# Patient Record
Sex: FEMALE | Race: WHITE | NOT HISPANIC OR LATINO | Employment: OTHER | URBAN - METROPOLITAN AREA
[De-identification: names, ages, dates, MRNs, and addresses within clinical notes are randomized per-mention and may not be internally consistent; named-entity substitution may affect disease eponyms.]

---

## 2017-02-17 ENCOUNTER — TRANSCRIBE ORDERS (OUTPATIENT)
Dept: ADMINISTRATIVE | Facility: HOSPITAL | Age: 82
End: 2017-02-17

## 2017-02-17 DIAGNOSIS — Z12.31 VISIT FOR SCREENING MAMMOGRAM: Primary | ICD-10-CM

## 2017-03-02 ENCOUNTER — APPOINTMENT (OUTPATIENT)
Dept: PREADMISSION TESTING | Facility: HOSPITAL | Age: 82
End: 2017-03-02

## 2017-03-02 ENCOUNTER — HOSPITAL ENCOUNTER (OUTPATIENT)
Dept: GENERAL RADIOLOGY | Facility: HOSPITAL | Age: 82
Discharge: HOME | End: 2017-03-02
Admitting: ORTHOPAEDIC SURGERY

## 2017-03-02 ENCOUNTER — HOSPITAL ENCOUNTER (OUTPATIENT)
Dept: GENERAL RADIOLOGY | Facility: HOSPITAL | Age: 82
Discharge: HOME | End: 2017-03-02

## 2017-03-02 VITALS
RESPIRATION RATE: 16 BRPM | HEIGHT: 60 IN | OXYGEN SATURATION: 99 % | DIASTOLIC BLOOD PRESSURE: 83 MMHG | WEIGHT: 133.4 LBS | TEMPERATURE: 97.3 F | BODY MASS INDEX: 26.19 KG/M2 | SYSTOLIC BLOOD PRESSURE: 135 MMHG

## 2017-03-02 DIAGNOSIS — M25.561 CHRONIC PAIN OF RIGHT KNEE: Primary | ICD-10-CM

## 2017-03-02 DIAGNOSIS — G89.29 CHRONIC PAIN OF RIGHT KNEE: Primary | ICD-10-CM

## 2017-03-02 LAB
ABO GROUP BLD: NORMAL
ALBUMIN SERPL-MCNC: 4.1 G/DL (ref 3.5–5.2)
ALBUMIN/GLOB SERPL: 1.5 G/DL
ALP SERPL-CCNC: 66 U/L (ref 39–117)
ALT SERPL W P-5'-P-CCNC: 17 U/L (ref 1–33)
ANION GAP SERPL CALCULATED.3IONS-SCNC: 12.6 MMOL/L
AST SERPL-CCNC: 22 U/L (ref 1–32)
BACTERIA UR QL AUTO: ABNORMAL /HPF
BILIRUB SERPL-MCNC: 0.4 MG/DL (ref 0.1–1.2)
BILIRUB UR QL STRIP: NEGATIVE
BLD GP AB SCN SERPL QL: NEGATIVE
BUN BLD-MCNC: 17 MG/DL (ref 8–23)
BUN/CREAT SERPL: 15.5 (ref 7–25)
CALCIUM SPEC-SCNC: 10.8 MG/DL (ref 8.6–10.5)
CHLORIDE SERPL-SCNC: 99 MMOL/L (ref 98–107)
CLARITY UR: CLEAR
CO2 SERPL-SCNC: 28.4 MMOL/L (ref 22–29)
COLOR UR: YELLOW
CREAT BLD-MCNC: 1.1 MG/DL (ref 0.57–1)
DEPRECATED RDW RBC AUTO: 45 FL (ref 37–54)
ERYTHROCYTE [DISTWIDTH] IN BLOOD BY AUTOMATED COUNT: 13.2 % (ref 11.7–13)
GFR SERPL CREATININE-BSD FRML MDRD: 48 ML/MIN/1.73
GLOBULIN UR ELPH-MCNC: 2.8 GM/DL
GLUCOSE BLD-MCNC: 88 MG/DL (ref 65–99)
GLUCOSE UR STRIP-MCNC: NEGATIVE MG/DL
HCT VFR BLD AUTO: 40.6 % (ref 35.6–45.5)
HGB BLD-MCNC: 13.4 G/DL (ref 11.9–15.5)
HGB UR QL STRIP.AUTO: NEGATIVE
HYALINE CASTS UR QL AUTO: ABNORMAL /LPF
INR PPP: 1.08 (ref 0.9–1.1)
KETONES UR QL STRIP: NEGATIVE
LEUKOCYTE ESTERASE UR QL STRIP.AUTO: ABNORMAL
MCH RBC QN AUTO: 30.7 PG (ref 26.9–32)
MCHC RBC AUTO-ENTMCNC: 33 G/DL (ref 32.4–36.3)
MCV RBC AUTO: 93.1 FL (ref 80.5–98.2)
NITRITE UR QL STRIP: NEGATIVE
PH UR STRIP.AUTO: <=5 [PH] (ref 5–8)
PLATELET # BLD AUTO: 346 10*3/MM3 (ref 140–500)
PMV BLD AUTO: 10.5 FL (ref 6–12)
POTASSIUM BLD-SCNC: 4.2 MMOL/L (ref 3.5–5.2)
PROT SERPL-MCNC: 6.9 G/DL (ref 6–8.5)
PROT UR QL STRIP: NEGATIVE
PROTHROMBIN TIME: 13.6 SECONDS (ref 11.7–14.2)
RBC # BLD AUTO: 4.36 10*6/MM3 (ref 3.9–5.2)
RBC # UR: ABNORMAL /HPF
REF LAB TEST METHOD: ABNORMAL
RH BLD: POSITIVE
SODIUM BLD-SCNC: 140 MMOL/L (ref 136–145)
SP GR UR STRIP: 1.01 (ref 1–1.03)
SQUAMOUS #/AREA URNS HPF: ABNORMAL /HPF
UROBILINOGEN UR QL STRIP: ABNORMAL
WBC NRBC COR # BLD: 5.65 10*3/MM3 (ref 4.5–10.7)
WBC UR QL AUTO: ABNORMAL /HPF

## 2017-03-02 PROCEDURE — 97161 PT EVAL LOW COMPLEX 20 MIN: CPT

## 2017-03-02 PROCEDURE — 71020 HC CHEST PA AND LATERAL: CPT

## 2017-03-02 PROCEDURE — G8980 MOBILITY D/C STATUS: HCPCS

## 2017-03-02 PROCEDURE — 73560 X-RAY EXAM OF KNEE 1 OR 2: CPT

## 2017-03-02 PROCEDURE — G8979 MOBILITY GOAL STATUS: HCPCS

## 2017-03-02 PROCEDURE — 85027 COMPLETE CBC AUTOMATED: CPT | Performed by: ORTHOPAEDIC SURGERY

## 2017-03-02 PROCEDURE — 36415 COLL VENOUS BLD VENIPUNCTURE: CPT

## 2017-03-02 PROCEDURE — 86850 RBC ANTIBODY SCREEN: CPT

## 2017-03-02 PROCEDURE — 93005 ELECTROCARDIOGRAM TRACING: CPT

## 2017-03-02 PROCEDURE — 85610 PROTHROMBIN TIME: CPT | Performed by: ORTHOPAEDIC SURGERY

## 2017-03-02 PROCEDURE — G8978 MOBILITY CURRENT STATUS: HCPCS

## 2017-03-02 PROCEDURE — 80053 COMPREHEN METABOLIC PANEL: CPT | Performed by: ORTHOPAEDIC SURGERY

## 2017-03-02 PROCEDURE — 86901 BLOOD TYPING SEROLOGIC RH(D): CPT

## 2017-03-02 PROCEDURE — 81001 URINALYSIS AUTO W/SCOPE: CPT | Performed by: ORTHOPAEDIC SURGERY

## 2017-03-02 PROCEDURE — 87086 URINE CULTURE/COLONY COUNT: CPT | Performed by: ORTHOPAEDIC SURGERY

## 2017-03-02 PROCEDURE — 86900 BLOOD TYPING SEROLOGIC ABO: CPT

## 2017-03-02 RX ORDER — CHLORHEXIDINE GLUCONATE 500 MG/1
1 CLOTH TOPICAL TAKE AS DIRECTED
COMMUNITY
End: 2017-03-19 | Stop reason: HOSPADM

## 2017-03-02 NOTE — DISCHARGE INSTRUCTIONS
Take the following medications the morning of surgery with a small sip of water.  OMEPRAZOLE, SYMBICORT    Arrive to hospital on your day of surgery at 5:30 am    General Instructions:  • Do not eat or drink after midnight: includes water, mints, or gum. You may brush your teeth.  • Do not smoke, chew tobacco, or drink alcohol.  • The Pre-Admission Testing nurse will instruct you to bring medications if unable to obtain an accurate list in Pre-Admission Testing.    • If applicable bring your C-PAP/ BI-PAP machine.  • Bring any papers given to you in the doctor’s office.  • Wear clean comfortable clothes and socks.  • Do not wear contact lenses or make-up.  Bring a case for your glasses if applicable.   • Bring crutches or walker if applicable.  • Leave all other valuables and jewelry at home.    If you were given a blood bank ID arm band remember to bring it with you the day of surgery.    Preventing a Surgical Site Infection:  Shower on the morning of surgery using a fresh bar of anti-bacterial soap (such as Dial) and clean washcloth.  Dry with a clean towel and dress in clean clothing.  For 2 to 3 days before surgery, avoid shaving with a razor near where you will have surgery because the razor can irritate skin and make it easier to develop an infection  Ask your surgeon if you will be receiving antibiotics prior to surgery  Make sure you, your family, and all healthcare providers clean their hands with soap and water or an alcohol based hand  before caring for you or your wound  If at all possible, quit smoking as many days before surgery as you can.    Day of surgery:  Upon arrival, a Pre-op nurse and Anesthesiologist will review your health history, obtain vital signs, and answer questions you may have.  The only belongings needed at this time will be your home medications and if applicable your C-PAP/BI-PAP machine.  If you are staying overnight your family can leave the rest of your belongings in  the car and bring them to your room later.  A Pre-op nurse will start an IV and you may receive medication in preparation for surgery, including something to help you relax.  Your family will be able to see you in the Pre-op area.  While you are in surgery your family should notify the waiting room  if they leave the waiting room area and provide a contact phone number.    Please be aware that surgery does come with discomfort.  We want to make every effort to control your discomfort so please discuss any uncontrolled symptoms with your nurse.   Your doctor will most likely have prescribed pain medications.      If you are going home after surgery you will receive individualized written care instructions before being discharged.  A responsible adult must drive you to and from the hospital on the day of your surgery and stay with you for 24 hours.    If you are staying overnight following surgery, you will be transported to your hospital room following the recovery period.  [facility] has all private rooms.    If you have any questions please call Pre-Admission Testing at [phone].  Deductibles and co-payments are collected on the day of service. Please be prepared to pay the required co-pay, deductible or deposit on the day of service as defined by your plan.    2% CHLORAHEXIDINE GLUCONATE* CLOTH  Preparing or “prepping” skin before surgery can reduce the risk of infection at the surgical site. To make the process easier, [facility] has chosen disposable cloths moistened with a rinse-free, 2% Chlorhexidine Gluconate (CHG) antiseptic solution. The steps below outline the prepping process and should be carefully followed.        Use the prep cloth on the area that is circled in the diagram             Directions Night before Surgery  1) Shower using a fresh bar of anti-bacterial soap (such as Dial) and clean washcloth.  Use a clean towel to completely dry your skin.  2) Do not  use any lotions, oils or creams on your skin.  3) Open the package and remove 1 cloth, wipe your skin for 30 seconds in a circular motion.  Allow to dry for 3 minutes.  4) Repeat #3 with second cloth.  5) Do not touch your eyes, ears, or mouth with the prep cloth.  6) Allow the wet prep solution to air dry.  7) Discard the prep cloth and wash your hands with soap and water.   8) Dress in clean bed clothes and sleep on fresh clean bed sheets.   9) You may experience some temporary itching after the prep.    Directions Day of Surgery  1) Repeat steps 1,2,3,4,5,6,7, and 9.   2) Dress in clean clothes before coming to the hospital.    BACTROBAN NASAL OINTMENT  There are many germs normally in your nose. Bactroban is an ointment that will help reduce these germs. Please follow these instructions for Bactroban use:    ____The day before surgery in the morning  Date________    ____The day before surgery in the evening              Date________    ____The day of surgery in the morning    Date________    **Squirt ½ package of Bactroban Ointment onto a cotton applicator and apply to inside of 1st nostril.  Squirt the remaining Bactroban and apply to the inside of the other nostril.

## 2017-03-02 NOTE — PROGRESS NOTES
Physical Therapy Outpatient Preoperative Total Joint Evaluation     Patient Name: Elisa Kunz  : 1935  MRN: [id number]  Today's Date: 3/2/2017         Surgery Date: 17    There is no problem list on file for this patient.       Past Medical History   Diagnosis Date   • Arthritis    • COPD (chronic obstructive pulmonary disease)    • Edema, lower extremity      BILATERAL    • GERD (gastroesophageal reflux disease)    • GERD (gastroesophageal reflux disease)    • High cholesterol    • History of diverticulitis    • Hyperlipidemia    • Knee pain, bilateral    • Low back pain    • Osteoporosis    • Peripheral venous insufficiency      BLE, HAS WRAPS FROM KNEES TO FEET, SEES [employer] IN [employer]   • Wears hearing aid         Past Surgical History   Procedure Laterality Date   • Hysterectomy     • Cholecystectomy     •  section       X 3   • Foot surgery       ? SIDE, HAS SCREW   • Lumbar spine surgery     • Cataract extraction Bilateral               TOTAL JOINT PREOP EVAL (last 72 hours)      Total Joint Preop Evaluation       17 1157                Preoperative Evaluation    Surgery TKR: Right  -TV        Surgery Date 17  -TV        Previous Total Joint No  -TV        Attended Class yes  -TV        Medical History Comment lymphedema both lower extremities  -TV        Prior Activity Status    All Household independent  -TV        All Community independent  -TV        Gait independent  -TV        Transfers independent  -TV         Spouse  -TV        DC Plans Home  -TV        Assist at home Spouse  -TV        Home Environment 2 story  -TV        Exterior steps 1 rail   3  -TV        Interior steps 1 rail   14  -TV        Pain 0-10    Pain Level 5  -TV        Location r knee  -TV        LE Measurements - ROM    Hip Flexion - Right AROM is WNL;Strength is grossly > or equal to 3/5  -TV        Hip Abduction - Right AROM is WNL;Strength is grossly >  or equal to 3/5  -TV        Knee Flexion - Right Strength is grossly > or equal to 3/5   115  -TV        Knee Extension - Right Strength is grossly > or equal to 3/5   -10  -TV        Hip Flexion - Left AROM is WNL;Strength is grossly > or equal to 3/5  -TV        Hip Abduction - Left AROM is WNL;Strength is grossly > or equal to 3/5  -TV        Knee Flexion - Left Strength is grossly > or equal to 3/5   115  -TV        Knee Extension - Left Strength is grossly > or equal to 3/5   -10  -TV        UE ROM/Strength    UE ROM/Strength adequate for walker use  -TV        Other Measurements    Sensation/Circulation intact  -TV        Gait Observation no device  -TV        Equipment    Equipment Patient Has Walker;Cane  -TV        ASSESSMENT    Goal Patient demonstrates good understanding of post-op P.T.;Exercises;Surgical Procedure  -TV        Goal Met? Yes  -TV        Anticipated Progress fair  -TV        Anticipated Progress Comment lymphedema might be an issue with recovery.    -TV        Patient agrees with Plan of Treatment? Yes  -TV        Plan Will see for post op TJR protocol  -TV          User Key  (r) = Recorded By, (t) = Taken By, (c) = Cosigned By    Initials Name Effective Dates    TV Teresa Ballesteros, PT 01/07/16 -              Time Calculation:          Therapy Charges for Today     Code Description Service Date Service Provider Modifiers Qty    12882532927 HC PT MOBILITY CURRENT 3/2/2017 Teresa Ballesteros, PT GP, CI 1    46695127889 HC PT MOBILITY PROJECTED 3/2/2017 Teresa Ballesteros, PT GP, CI 1    46698001032 HC PT MOBILITY DISCHARGE 3/2/2017 Teresa Ballesteros, PT GP, CI 1    27347172197 HC PAT-TOTAL JOINT CLASS 3/2/2017 Teresa Ballesteros, PT  1    75177003366 HC PT EVAL LOW COMPLEXITY 1 3/2/2017 Teresa Ballesteros, PT GP 1            PT G-Codes  PT Professional Judgement Used?: Yes  Functional Limitation: Mobility: Walking and moving around  Mobility: Walking and Moving Around Current Status (): At least 1 percent but less than 20  percent impaired, limited or restricted  Mobility: Walking and Moving Around Goal Status (): At least 1 percent but less than 20 percent impaired, limited or restricted  Mobility: Walking and Moving Around Discharge Status (): At least 1 percent but less than 20 percent impaired, limited or restricted      Teresa Ballesteros, PT  3/2/2017

## 2017-03-04 LAB — BACTERIA SPEC AEROBE CULT: NORMAL

## 2017-03-10 ENCOUNTER — APPOINTMENT (OUTPATIENT)
Dept: MAMMOGRAPHY | Facility: HOSPITAL | Age: 82
End: 2017-03-10

## 2017-03-15 NOTE — PROGRESS NOTES
Pre-Operative Orthopedic Assessment      Patient: Elisa Kunz    YOB: 1935      Age/Gender: 81 y.o. female  Medical Record Number: [id number]  Surgical Procedure Planned: OH TOTAL KNEE ARTHROPLASTY [09054] (TOTAL KNEE ARTHROPLASTY)     Surgeon: Boris Espinoza MD    Date of Surgery Planned: 03/16/2017    Question Value Score    Patient Score   1. What is your age group? 50-65 years  66-75 years  >75 years =2  =1  =0 0   2. Gender? Male  Female =2  =1 1   3. How far on average can you walk? (a block is 200 meters) Two blocks or more (+\- rest)  1-2 blocks (+\- rest)  Housebound (most of time) =2  =1  =0 0   4. Which gait aid to you use? (more often than not) None  Single-Point Stick  Crutches/Frame =2  =1  =0 2   5. Do you use community  supports? (home help, meals on wheels, district nursing) None or one per week  Two or more per week =1  =0 1   6. Will you live with someone who can care for you after your operation? Yes  No =3  =0 3      Your Score (out of 12)  7     Key Destination at Discharge from acute care predicted by score   Scores < 6  -- extended inpatient rehabilitation   Scores 6-9 -- additional intervention to discharge directly home (Rehabilitation in home)   Scores > 9 -- directly home         Discharge Disposition/Planning:     Patient Response   Discussed the Predicted discharge disposition needed based on RAPT Assessment with the patient.    yes   Patient selected discharge disposition:   home   Out Patient Rehabilitation Facility of Choice:    None selected/probably wants OP    Home Health Services Preferred:   None selected   Has the patient completed or been scheduled to complete pre-operative testing? yes   Post-Operative Care Giver Name and Phone Number:    Juanito Santizo  [phone]     Subacute Inpatient Rehabilitation:  Complete this section only if planning inpatient services at a Subacute Facility     Patient Response   Subacute Facility  Preferred (Please list 2 facilities:      Requires pre-certification for inpatient rehabilitation services?         Planned source of transportation to inpatient rehabilitation facility?       If choosing inpatient services at an Acute or Subacute Facility please list a subsequent back-up plan (in case patient fails to qualify for inpatient rehabilitation). Back-up plans should include caregiver (family member or friend) for first 24-48 post- -operatively.       Home Equipment Patient Response   Does patient have a walker for home use?    yes   Does patient have a 3 in 1 commode for home use?    no   Does patient have a shower chair for home use?    no   Does patient have an elevated commode seat for home use? yes   Does patient have a cane for home use?    yes   Is there any other medical equipment in the home? If so,  List in comment section below no   Pre-Operative Class Attendance Patient Response   Attended or scheduled to attend the pre-operative class within 1 year of total joint replacement? yes   Not planning to attend the pre-operative class    n/a   Has attended the pre-operative class > 1 year ago    n/a   Does not want to attend the class    n/a   Was misinformed that did not need to attend the class    n/a   Class time is not convenient    n/a   Other reasons for refusing to attend the pre-operative class (if yes, see comments below)   n/a   Patient Education  Completed   Expected time of discharge discussed yes   Encouraged to attend Pre-Operative Class    attended   Education re: Back-up plan for patients planning discharge to subacute facilities n/a   Education re: Predicted Discharge Disposition based on RAPT score    yes   Patient receptive and voiced understanding of information given    yes                                                                                                            Comments:    Discussed discharge plan with both patient and spouse on phone ad patient is leaning  toward OP vs. HH however has not had exposure to either and will need provider choice list.                                       Signature: Rosio Philip RN    Date:  3/15/2017

## 2017-03-16 ENCOUNTER — HOSPITAL ENCOUNTER (INPATIENT)
Facility: HOSPITAL | Age: 82
LOS: 3 days | Discharge: NURSING, REHAB OR LONG-TERM CARE | End: 2017-03-19
Attending: ORTHOPAEDIC SURGERY | Admitting: ORTHOPAEDIC SURGERY

## 2017-03-16 ENCOUNTER — ANESTHESIA EVENT (OUTPATIENT)
Dept: PERIOP | Facility: HOSPITAL | Age: 82
End: 2017-03-16

## 2017-03-16 ENCOUNTER — APPOINTMENT (OUTPATIENT)
Dept: GENERAL RADIOLOGY | Facility: HOSPITAL | Age: 82
End: 2017-03-16

## 2017-03-16 ENCOUNTER — ANESTHESIA (OUTPATIENT)
Dept: PERIOP | Facility: HOSPITAL | Age: 82
End: 2017-03-16

## 2017-03-16 DIAGNOSIS — R26.2 DIFFICULTY WALKING: Primary | ICD-10-CM

## 2017-03-16 PROBLEM — M17.9 OA (OSTEOARTHRITIS) OF KNEE: Status: ACTIVE | Noted: 2017-03-16

## 2017-03-16 PROCEDURE — 25010000002 FENTANYL CITRATE (PF) 100 MCG/2ML SOLUTION: Performed by: NURSE ANESTHETIST, CERTIFIED REGISTERED

## 2017-03-16 PROCEDURE — 25010000003 CEFAZOLIN IN DEXTROSE 2-4 GM/100ML-% SOLUTION: Performed by: ORTHOPAEDIC SURGERY

## 2017-03-16 PROCEDURE — 25010000002 PROMETHAZINE PER 50 MG: Performed by: ORTHOPAEDIC SURGERY

## 2017-03-16 PROCEDURE — 25010000002 CEFAZOLIN PER 500 MG: Performed by: NURSE ANESTHETIST, CERTIFIED REGISTERED

## 2017-03-16 PROCEDURE — C1713 ANCHOR/SCREW BN/BN,TIS/BN: HCPCS | Performed by: ORTHOPAEDIC SURGERY

## 2017-03-16 PROCEDURE — 25010000002 ONDANSETRON PER 1 MG: Performed by: NURSE ANESTHETIST, CERTIFIED REGISTERED

## 2017-03-16 PROCEDURE — C1776 JOINT DEVICE (IMPLANTABLE): HCPCS | Performed by: ORTHOPAEDIC SURGERY

## 2017-03-16 PROCEDURE — 25010000002 KETOROLAC TROMETHAMINE PER 15 MG

## 2017-03-16 PROCEDURE — 73560 X-RAY EXAM OF KNEE 1 OR 2: CPT

## 2017-03-16 PROCEDURE — 0SRC0J9 REPLACEMENT OF RIGHT KNEE JOINT WITH SYNTHETIC SUBSTITUTE, CEMENTED, OPEN APPROACH: ICD-10-PCS | Performed by: ORTHOPAEDIC SURGERY

## 2017-03-16 PROCEDURE — 25010000002 KETOROLAC TROMETHAMINE PER 15 MG: Performed by: ORTHOPAEDIC SURGERY

## 2017-03-16 PROCEDURE — 97110 THERAPEUTIC EXERCISES: CPT

## 2017-03-16 PROCEDURE — 25010000002 NEOSTIGMINE 10 MG/10ML SOLUTION: Performed by: NURSE ANESTHETIST, CERTIFIED REGISTERED

## 2017-03-16 PROCEDURE — 25010000002 MIDAZOLAM PER 1 MG: Performed by: ANESTHESIOLOGY

## 2017-03-16 PROCEDURE — 25010000002 PROPOFOL 10 MG/ML EMULSION: Performed by: NURSE ANESTHETIST, CERTIFIED REGISTERED

## 2017-03-16 PROCEDURE — 94799 UNLISTED PULMONARY SVC/PX: CPT

## 2017-03-16 PROCEDURE — 25010000002 EPINEPHRINE PER 0.1 MG: Performed by: ORTHOPAEDIC SURGERY

## 2017-03-16 PROCEDURE — 25010000002 MORPHINE (PF) 10 MG/ML SOLUTION 1 ML VIAL: Performed by: ORTHOPAEDIC SURGERY

## 2017-03-16 PROCEDURE — 25010000002 DEXAMETHASONE PER 1 MG: Performed by: NURSE ANESTHETIST, CERTIFIED REGISTERED

## 2017-03-16 PROCEDURE — 25010000002 ROPIVACAINE PER 1 MG: Performed by: ORTHOPAEDIC SURGERY

## 2017-03-16 PROCEDURE — 97162 PT EVAL MOD COMPLEX 30 MIN: CPT

## 2017-03-16 DEVICE — CAP TOTL KN CMT PREMIUM: Type: IMPLANTABLE DEVICE | Site: KNEE | Status: FUNCTIONAL

## 2017-03-16 DEVICE — IMPLANTABLE DEVICE
Type: IMPLANTABLE DEVICE | Site: KNEE | Status: FUNCTIONAL
Brand: BIOMET KNEE SYSTEM

## 2017-03-16 DEVICE — IMPLANTABLE DEVICE
Type: IMPLANTABLE DEVICE | Site: KNEE | Status: FUNCTIONAL
Brand: VANGUARD® KNEE SYSTEM

## 2017-03-16 DEVICE — IMPLANTABLE DEVICE
Type: IMPLANTABLE DEVICE | Site: KNEE | Status: FUNCTIONAL
Brand: VANGUARD KNEE SYSTEM

## 2017-03-16 DEVICE — BEAR TIB/KN VANGUARD CR DCM 14X71/75MM NS: Type: IMPLANTABLE DEVICE | Site: KNEE | Status: FUNCTIONAL

## 2017-03-16 DEVICE — CMT BONE PALACOS 120001: Type: IMPLANTABLE DEVICE | Site: KNEE | Status: FUNCTIONAL

## 2017-03-16 RX ORDER — DIPHENHYDRAMINE HYDROCHLORIDE 50 MG/ML
12.5 INJECTION INTRAMUSCULAR; INTRAVENOUS
Status: DISCONTINUED | OUTPATIENT
Start: 2017-03-16 | End: 2017-03-16 | Stop reason: HOSPADM

## 2017-03-16 RX ORDER — HYDROMORPHONE HYDROCHLORIDE 1 MG/ML
0.5 INJECTION, SOLUTION INTRAMUSCULAR; INTRAVENOUS; SUBCUTANEOUS
Status: DISCONTINUED | OUTPATIENT
Start: 2017-03-16 | End: 2017-03-16 | Stop reason: HOSPADM

## 2017-03-16 RX ORDER — SODIUM CHLORIDE 0.9 % (FLUSH) 0.9 %
1-10 SYRINGE (ML) INJECTION AS NEEDED
Status: DISCONTINUED | OUTPATIENT
Start: 2017-03-16 | End: 2017-03-19 | Stop reason: HOSPADM

## 2017-03-16 RX ORDER — DEXAMETHASONE SODIUM PHOSPHATE 10 MG/ML
INJECTION INTRAMUSCULAR; INTRAVENOUS AS NEEDED
Status: DISCONTINUED | OUTPATIENT
Start: 2017-03-16 | End: 2017-03-16 | Stop reason: SURG

## 2017-03-16 RX ORDER — FERROUS SULFATE 325(65) MG
325 TABLET ORAL
Status: DISCONTINUED | OUTPATIENT
Start: 2017-03-16 | End: 2017-03-19 | Stop reason: HOSPADM

## 2017-03-16 RX ORDER — BUDESONIDE AND FORMOTEROL FUMARATE DIHYDRATE 80; 4.5 UG/1; UG/1
2 AEROSOL RESPIRATORY (INHALATION) 2 TIMES DAILY
Status: DISCONTINUED | OUTPATIENT
Start: 2017-03-16 | End: 2017-03-19 | Stop reason: HOSPADM

## 2017-03-16 RX ORDER — ROCURONIUM BROMIDE 10 MG/ML
INJECTION, SOLUTION INTRAVENOUS AS NEEDED
Status: DISCONTINUED | OUTPATIENT
Start: 2017-03-16 | End: 2017-03-16 | Stop reason: SURG

## 2017-03-16 RX ORDER — ASPIRIN 325 MG
325 TABLET, DELAYED RELEASE (ENTERIC COATED) ORAL DAILY
Status: DISCONTINUED | OUTPATIENT
Start: 2017-03-17 | End: 2017-03-19 | Stop reason: HOSPADM

## 2017-03-16 RX ORDER — LABETALOL HYDROCHLORIDE 5 MG/ML
5 INJECTION, SOLUTION INTRAVENOUS
Status: DISCONTINUED | OUTPATIENT
Start: 2017-03-16 | End: 2017-03-16 | Stop reason: HOSPADM

## 2017-03-16 RX ORDER — CLINDAMYCIN PHOSPHATE 900 MG/50ML
900 INJECTION INTRAVENOUS EVERY 8 HOURS
Status: COMPLETED | OUTPATIENT
Start: 2017-03-16 | End: 2017-03-16

## 2017-03-16 RX ORDER — CEFAZOLIN SODIUM 500 MG/2.2ML
INJECTION, POWDER, FOR SOLUTION INTRAMUSCULAR; INTRAVENOUS AS NEEDED
Status: DISCONTINUED | OUTPATIENT
Start: 2017-03-16 | End: 2017-03-16 | Stop reason: SURG

## 2017-03-16 RX ORDER — DIAZEPAM 5 MG/1
5 TABLET ORAL EVERY 6 HOURS PRN
Status: DISCONTINUED | OUTPATIENT
Start: 2017-03-16 | End: 2017-03-19 | Stop reason: HOSPADM

## 2017-03-16 RX ORDER — SODIUM CHLORIDE 0.9 % (FLUSH) 0.9 %
1-10 SYRINGE (ML) INJECTION AS NEEDED
Status: DISCONTINUED | OUTPATIENT
Start: 2017-03-16 | End: 2017-03-16 | Stop reason: HOSPADM

## 2017-03-16 RX ORDER — SODIUM CHLORIDE, SODIUM LACTATE, POTASSIUM CHLORIDE, CALCIUM CHLORIDE 600; 310; 30; 20 MG/100ML; MG/100ML; MG/100ML; MG/100ML
9 INJECTION, SOLUTION INTRAVENOUS CONTINUOUS PRN
Status: DISCONTINUED | OUTPATIENT
Start: 2017-03-16 | End: 2017-03-16 | Stop reason: HOSPADM

## 2017-03-16 RX ORDER — LANOLIN ALCOHOL/MO/W.PET/CERES
400 CREAM (GRAM) TOPICAL DAILY
Status: DISCONTINUED | OUTPATIENT
Start: 2017-03-16 | End: 2017-03-19 | Stop reason: HOSPADM

## 2017-03-16 RX ORDER — HYDRALAZINE HYDROCHLORIDE 20 MG/ML
5 INJECTION INTRAMUSCULAR; INTRAVENOUS
Status: DISCONTINUED | OUTPATIENT
Start: 2017-03-16 | End: 2017-03-16 | Stop reason: HOSPADM

## 2017-03-16 RX ORDER — FENOFIBRATE 145 MG/1
145 TABLET, COATED ORAL DAILY
Status: DISCONTINUED | OUTPATIENT
Start: 2017-03-16 | End: 2017-03-19 | Stop reason: HOSPADM

## 2017-03-16 RX ORDER — MORPHINE SULFATE 2 MG/ML
6 INJECTION, SOLUTION INTRAMUSCULAR; INTRAVENOUS
Status: DISCONTINUED | OUTPATIENT
Start: 2017-03-16 | End: 2017-03-19 | Stop reason: HOSPADM

## 2017-03-16 RX ORDER — PRAVASTATIN SODIUM 40 MG
40 TABLET ORAL DAILY
Status: DISCONTINUED | OUTPATIENT
Start: 2017-03-16 | End: 2017-03-19 | Stop reason: HOSPADM

## 2017-03-16 RX ORDER — FAMOTIDINE 10 MG/ML
20 INJECTION, SOLUTION INTRAVENOUS
Status: DISCONTINUED | OUTPATIENT
Start: 2017-03-16 | End: 2017-03-16 | Stop reason: HOSPADM

## 2017-03-16 RX ORDER — ACETAMINOPHEN 325 MG/1
650 TABLET ORAL EVERY 4 HOURS PRN
Status: DISCONTINUED | OUTPATIENT
Start: 2017-03-16 | End: 2017-03-19 | Stop reason: HOSPADM

## 2017-03-16 RX ORDER — KETOROLAC TROMETHAMINE 15 MG/ML
INJECTION, SOLUTION INTRAMUSCULAR; INTRAVENOUS
Status: COMPLETED
Start: 2017-03-16 | End: 2017-03-16

## 2017-03-16 RX ORDER — DOCUSATE SODIUM 100 MG/1
100 CAPSULE, LIQUID FILLED ORAL 2 TIMES DAILY PRN
Status: DISCONTINUED | OUTPATIENT
Start: 2017-03-16 | End: 2017-03-19 | Stop reason: HOSPADM

## 2017-03-16 RX ORDER — ONDANSETRON 4 MG/1
4 TABLET, ORALLY DISINTEGRATING ORAL EVERY 6 HOURS PRN
Status: DISCONTINUED | OUTPATIENT
Start: 2017-03-16 | End: 2017-03-19 | Stop reason: HOSPADM

## 2017-03-16 RX ORDER — PROPOFOL 10 MG/ML
VIAL (ML) INTRAVENOUS AS NEEDED
Status: DISCONTINUED | OUTPATIENT
Start: 2017-03-16 | End: 2017-03-16 | Stop reason: SURG

## 2017-03-16 RX ORDER — MAGNESIUM HYDROXIDE 1200 MG/15ML
LIQUID ORAL AS NEEDED
Status: DISCONTINUED | OUTPATIENT
Start: 2017-03-16 | End: 2017-03-16 | Stop reason: HOSPADM

## 2017-03-16 RX ORDER — KETOROLAC TROMETHAMINE 30 MG/ML
15 INJECTION, SOLUTION INTRAMUSCULAR; INTRAVENOUS EVERY 8 HOURS PRN
Status: DISCONTINUED | OUTPATIENT
Start: 2017-03-16 | End: 2017-03-19 | Stop reason: HOSPADM

## 2017-03-16 RX ORDER — SENNA AND DOCUSATE SODIUM 50; 8.6 MG/1; MG/1
2 TABLET, FILM COATED ORAL 2 TIMES DAILY
Status: DISCONTINUED | OUTPATIENT
Start: 2017-03-16 | End: 2017-03-19 | Stop reason: HOSPADM

## 2017-03-16 RX ORDER — NALOXONE HCL 0.4 MG/ML
0.2 VIAL (ML) INJECTION AS NEEDED
Status: DISCONTINUED | OUTPATIENT
Start: 2017-03-16 | End: 2017-03-16 | Stop reason: HOSPADM

## 2017-03-16 RX ORDER — MIDAZOLAM HYDROCHLORIDE 1 MG/ML
1 INJECTION INTRAMUSCULAR; INTRAVENOUS
Status: DISCONTINUED | OUTPATIENT
Start: 2017-03-16 | End: 2017-03-16 | Stop reason: HOSPADM

## 2017-03-16 RX ORDER — OXYCODONE HYDROCHLORIDE AND ACETAMINOPHEN 5; 325 MG/1; MG/1
2 TABLET ORAL EVERY 4 HOURS PRN
Status: DISCONTINUED | OUTPATIENT
Start: 2017-03-16 | End: 2017-03-17

## 2017-03-16 RX ORDER — FUROSEMIDE 40 MG/1
40 TABLET ORAL 2 TIMES DAILY
Status: DISCONTINUED | OUTPATIENT
Start: 2017-03-16 | End: 2017-03-19 | Stop reason: HOSPADM

## 2017-03-16 RX ORDER — NEOSTIGMINE METHYLSULFATE 1 MG/ML
INJECTION, SOLUTION INTRAVENOUS AS NEEDED
Status: DISCONTINUED | OUTPATIENT
Start: 2017-03-16 | End: 2017-03-16 | Stop reason: SURG

## 2017-03-16 RX ORDER — CELECOXIB 200 MG/1
200 CAPSULE ORAL DAILY
Status: DISCONTINUED | OUTPATIENT
Start: 2017-03-16 | End: 2017-03-19 | Stop reason: HOSPADM

## 2017-03-16 RX ORDER — SODIUM CHLORIDE 450 MG/100ML
100 INJECTION, SOLUTION INTRAVENOUS CONTINUOUS
Status: DISCONTINUED | OUTPATIENT
Start: 2017-03-16 | End: 2017-03-19 | Stop reason: HOSPADM

## 2017-03-16 RX ORDER — SPIRONOLACTONE 25 MG/1
25 TABLET ORAL 2 TIMES DAILY
Status: DISCONTINUED | OUTPATIENT
Start: 2017-03-16 | End: 2017-03-19 | Stop reason: HOSPADM

## 2017-03-16 RX ORDER — ACETAMINOPHEN 500 MG
1000 TABLET ORAL ONCE
Status: COMPLETED | OUTPATIENT
Start: 2017-03-16 | End: 2017-03-16

## 2017-03-16 RX ORDER — PROMETHAZINE HYDROCHLORIDE 25 MG/ML
12.5 INJECTION, SOLUTION INTRAMUSCULAR; INTRAVENOUS EVERY 6 HOURS PRN
Status: DISCONTINUED | OUTPATIENT
Start: 2017-03-16 | End: 2017-03-19 | Stop reason: HOSPADM

## 2017-03-16 RX ORDER — MONTELUKAST SODIUM 10 MG/1
10 TABLET ORAL NIGHTLY
Status: DISCONTINUED | OUTPATIENT
Start: 2017-03-16 | End: 2017-03-19 | Stop reason: HOSPADM

## 2017-03-16 RX ORDER — FLUMAZENIL 0.1 MG/ML
0.2 INJECTION INTRAVENOUS AS NEEDED
Status: DISCONTINUED | OUTPATIENT
Start: 2017-03-16 | End: 2017-03-16 | Stop reason: HOSPADM

## 2017-03-16 RX ORDER — PANTOPRAZOLE SODIUM 40 MG/1
40 TABLET, DELAYED RELEASE ORAL
Status: DISCONTINUED | OUTPATIENT
Start: 2017-03-16 | End: 2017-03-19 | Stop reason: HOSPADM

## 2017-03-16 RX ORDER — DIPHENHYDRAMINE HCL 25 MG
50 CAPSULE ORAL EVERY 6 HOURS PRN
Status: DISCONTINUED | OUTPATIENT
Start: 2017-03-16 | End: 2017-03-19 | Stop reason: HOSPADM

## 2017-03-16 RX ORDER — ONDANSETRON 2 MG/ML
4 INJECTION INTRAMUSCULAR; INTRAVENOUS EVERY 6 HOURS PRN
Status: DISCONTINUED | OUTPATIENT
Start: 2017-03-16 | End: 2017-03-19 | Stop reason: HOSPADM

## 2017-03-16 RX ORDER — NALOXONE HCL 0.4 MG/ML
0.4 VIAL (ML) INJECTION
Status: DISCONTINUED | OUTPATIENT
Start: 2017-03-16 | End: 2017-03-19 | Stop reason: HOSPADM

## 2017-03-16 RX ORDER — UREA 10 %
1 LOTION (ML) TOPICAL NIGHTLY PRN
Status: DISCONTINUED | OUTPATIENT
Start: 2017-03-16 | End: 2017-03-19 | Stop reason: HOSPADM

## 2017-03-16 RX ORDER — FENTANYL CITRATE 50 UG/ML
50 INJECTION, SOLUTION INTRAMUSCULAR; INTRAVENOUS
Status: DISCONTINUED | OUTPATIENT
Start: 2017-03-16 | End: 2017-03-16 | Stop reason: HOSPADM

## 2017-03-16 RX ORDER — ONDANSETRON 4 MG/1
4 TABLET, FILM COATED ORAL EVERY 6 HOURS PRN
Status: DISCONTINUED | OUTPATIENT
Start: 2017-03-16 | End: 2017-03-19 | Stop reason: HOSPADM

## 2017-03-16 RX ORDER — CEFAZOLIN SODIUM 2 G/100ML
2 INJECTION, SOLUTION INTRAVENOUS ONCE
Status: COMPLETED | OUTPATIENT
Start: 2017-03-16 | End: 2017-03-16

## 2017-03-16 RX ORDER — DIAZEPAM 5 MG/ML
5 INJECTION, SOLUTION INTRAMUSCULAR; INTRAVENOUS 2 TIMES DAILY PRN
Status: ACTIVE | OUTPATIENT
Start: 2017-03-16 | End: 2017-03-17

## 2017-03-16 RX ORDER — BUPIVACAINE HYDROCHLORIDE AND EPINEPHRINE 5; 5 MG/ML; UG/ML
INJECTION, SOLUTION EPIDURAL; INTRACAUDAL; PERINEURAL AS NEEDED
Status: DISCONTINUED | OUTPATIENT
Start: 2017-03-16 | End: 2017-03-16 | Stop reason: SURG

## 2017-03-16 RX ORDER — CELECOXIB 200 MG/1
200 CAPSULE ORAL ONCE
Status: DISCONTINUED | OUTPATIENT
Start: 2017-03-16 | End: 2017-03-19 | Stop reason: HOSPADM

## 2017-03-16 RX ORDER — CLINDAMYCIN PHOSPHATE 900 MG/50ML
900 INJECTION INTRAVENOUS ONCE
Status: DISCONTINUED | OUTPATIENT
Start: 2017-03-16 | End: 2017-03-19 | Stop reason: HOSPADM

## 2017-03-16 RX ORDER — FENTANYL CITRATE 50 UG/ML
INJECTION, SOLUTION INTRAMUSCULAR; INTRAVENOUS AS NEEDED
Status: DISCONTINUED | OUTPATIENT
Start: 2017-03-16 | End: 2017-03-16 | Stop reason: SURG

## 2017-03-16 RX ORDER — BISACODYL 10 MG
10 SUPPOSITORY, RECTAL RECTAL DAILY PRN
Status: DISCONTINUED | OUTPATIENT
Start: 2017-03-16 | End: 2017-03-19 | Stop reason: HOSPADM

## 2017-03-16 RX ORDER — ONDANSETRON 2 MG/ML
4 INJECTION INTRAMUSCULAR; INTRAVENOUS ONCE AS NEEDED
Status: COMPLETED | OUTPATIENT
Start: 2017-03-16 | End: 2017-03-16

## 2017-03-16 RX ORDER — GLYCOPYRROLATE 0.2 MG/ML
INJECTION INTRAMUSCULAR; INTRAVENOUS AS NEEDED
Status: DISCONTINUED | OUTPATIENT
Start: 2017-03-16 | End: 2017-03-16 | Stop reason: SURG

## 2017-03-16 RX ORDER — LIDOCAINE HYDROCHLORIDE 20 MG/ML
INJECTION, SOLUTION INFILTRATION; PERINEURAL AS NEEDED
Status: DISCONTINUED | OUTPATIENT
Start: 2017-03-16 | End: 2017-03-16 | Stop reason: SURG

## 2017-03-16 RX ORDER — ACETAMINOPHEN 325 MG/1
325 TABLET ORAL EVERY 4 HOURS PRN
Status: DISCONTINUED | OUTPATIENT
Start: 2017-03-16 | End: 2017-03-19 | Stop reason: HOSPADM

## 2017-03-16 RX ORDER — FUROSEMIDE 40 MG/1
40 TABLET ORAL 2 TIMES DAILY
COMMUNITY

## 2017-03-16 RX ORDER — ONDANSETRON 2 MG/ML
INJECTION INTRAMUSCULAR; INTRAVENOUS AS NEEDED
Status: DISCONTINUED | OUTPATIENT
Start: 2017-03-16 | End: 2017-03-16 | Stop reason: SURG

## 2017-03-16 RX ADMIN — SODIUM CHLORIDE 100 ML/HR: 4.5 INJECTION, SOLUTION INTRAVENOUS at 10:44

## 2017-03-16 RX ADMIN — MIDAZOLAM HYDROCHLORIDE 1 MG: 1 INJECTION, SOLUTION INTRAMUSCULAR; INTRAVENOUS at 06:41

## 2017-03-16 RX ADMIN — ACETAMINOPHEN 400 MCG: 400 TABLET ORAL at 14:03

## 2017-03-16 RX ADMIN — ACETAMINOPHEN 1000 MG: 500 TABLET ORAL at 06:22

## 2017-03-16 RX ADMIN — KETOROLAC TROMETHAMINE 15 MG: 15 INJECTION, SOLUTION INTRAMUSCULAR; INTRAVENOUS at 09:15

## 2017-03-16 RX ADMIN — DEXAMETHASONE SODIUM PHOSPHATE 8 MG: 10 INJECTION INTRAMUSCULAR; INTRAVENOUS at 08:33

## 2017-03-16 RX ADMIN — NEOSTIGMINE METHYLSULFATE 3 MG: 1 INJECTION INTRAVENOUS at 08:42

## 2017-03-16 RX ADMIN — ONDANSETRON 4 MG: 2 INJECTION INTRAMUSCULAR; INTRAVENOUS at 08:33

## 2017-03-16 RX ADMIN — PROPOFOL 200 MG: 10 INJECTION, EMULSION INTRAVENOUS at 07:03

## 2017-03-16 RX ADMIN — SPIRONOLACTONE 25 MG: 25 TABLET, FILM COATED ORAL at 18:39

## 2017-03-16 RX ADMIN — FENOFIBRATE 145 MG: 145 TABLET ORAL at 16:06

## 2017-03-16 RX ADMIN — CEFAZOLIN SODIUM 2 G: 500 INJECTION, POWDER, FOR SOLUTION INTRAMUSCULAR; INTRAVENOUS at 08:30

## 2017-03-16 RX ADMIN — FENTANYL CITRATE 50 MCG: 50 INJECTION INTRAMUSCULAR; INTRAVENOUS at 07:39

## 2017-03-16 RX ADMIN — MUPIROCIN: 20 OINTMENT TOPICAL at 14:04

## 2017-03-16 RX ADMIN — SODIUM CHLORIDE, POTASSIUM CHLORIDE, SODIUM LACTATE AND CALCIUM CHLORIDE: 600; 310; 30; 20 INJECTION, SOLUTION INTRAVENOUS at 06:58

## 2017-03-16 RX ADMIN — PANTOPRAZOLE SODIUM 40 MG: 40 TABLET, DELAYED RELEASE ORAL at 14:03

## 2017-03-16 RX ADMIN — ROCURONIUM BROMIDE 30 MG: 10 INJECTION INTRAVENOUS at 07:03

## 2017-03-16 RX ADMIN — FENTANYL CITRATE 25 MCG: 50 INJECTION INTRAMUSCULAR; INTRAVENOUS at 08:36

## 2017-03-16 RX ADMIN — MONTELUKAST 10 MG: 10 TABLET, FILM COATED ORAL at 21:58

## 2017-03-16 RX ADMIN — DOCUSATE SODIUM,SENNOSIDES 2 TABLET: 50; 8.6 TABLET, FILM COATED ORAL at 18:39

## 2017-03-16 RX ADMIN — LIDOCAINE HYDROCHLORIDE 60 MG: 20 INJECTION, SOLUTION INFILTRATION; PERINEURAL at 07:03

## 2017-03-16 RX ADMIN — Medication 1 MG: at 21:58

## 2017-03-16 RX ADMIN — DOCUSATE SODIUM,SENNOSIDES 2 TABLET: 50; 8.6 TABLET, FILM COATED ORAL at 14:03

## 2017-03-16 RX ADMIN — PRAVASTATIN SODIUM 40 MG: 40 TABLET ORAL at 14:04

## 2017-03-16 RX ADMIN — SPIRONOLACTONE 25 MG: 25 TABLET, FILM COATED ORAL at 14:04

## 2017-03-16 RX ADMIN — FERROUS SULFATE TAB 325 MG (65 MG ELEMENTAL FE) 325 MG: 325 (65 FE) TAB at 14:03

## 2017-03-16 RX ADMIN — CLINDAMYCIN PHOSPHATE 900 MG: 18 INJECTION, SOLUTION INTRAMUSCULAR; INTRAVENOUS at 16:06

## 2017-03-16 RX ADMIN — CELECOXIB 200 MG: 200 CAPSULE ORAL at 14:03

## 2017-03-16 RX ADMIN — CLINDAMYCIN PHOSPHATE 900 MG: 18 INJECTION, SOLUTION INTRAMUSCULAR; INTRAVENOUS at 21:59

## 2017-03-16 RX ADMIN — FENTANYL CITRATE 50 MCG: 50 INJECTION INTRAMUSCULAR; INTRAVENOUS at 09:15

## 2017-03-16 RX ADMIN — SODIUM CHLORIDE, POTASSIUM CHLORIDE, SODIUM LACTATE AND CALCIUM CHLORIDE 9 ML/HR: 600; 310; 30; 20 INJECTION, SOLUTION INTRAVENOUS at 06:40

## 2017-03-16 RX ADMIN — CEFAZOLIN SODIUM 2 G: 2 INJECTION, SOLUTION INTRAVENOUS at 07:01

## 2017-03-16 RX ADMIN — BUPIVACAINE HYDROCHLORIDE AND EPINEPHRINE BITARTRATE 20 ML: 5; .0091 INJECTION, SOLUTION EPIDURAL; INTRACAUDAL; PERINEURAL at 06:46

## 2017-03-16 RX ADMIN — GLYCOPYRROLATE 0.4 MG: 0.2 INJECTION INTRAMUSCULAR; INTRAVENOUS at 08:42

## 2017-03-16 RX ADMIN — OXYCODONE HYDROCHLORIDE AND ACETAMINOPHEN 2 TABLET: 5; 325 TABLET ORAL at 10:36

## 2017-03-16 RX ADMIN — FENTANYL CITRATE 25 MCG: 50 INJECTION INTRAMUSCULAR; INTRAVENOUS at 08:41

## 2017-03-16 RX ADMIN — FAMOTIDINE 20 MG: 10 INJECTION, SOLUTION INTRAVENOUS at 06:41

## 2017-03-16 RX ADMIN — FUROSEMIDE 40 MG: 40 TABLET ORAL at 14:03

## 2017-03-16 RX ADMIN — ONDANSETRON 4 MG: 2 INJECTION, SOLUTION INTRAMUSCULAR; INTRAVENOUS at 09:14

## 2017-03-16 RX ADMIN — FUROSEMIDE 40 MG: 40 TABLET ORAL at 18:39

## 2017-03-16 RX ADMIN — BUDESONIDE AND FORMOTEROL FUMARATE DIHYDRATE 2 PUFF: 80; 4.5 AEROSOL RESPIRATORY (INHALATION) at 20:58

## 2017-03-16 RX ADMIN — PROMETHAZINE HYDROCHLORIDE 12.5 MG: 25 INJECTION INTRAMUSCULAR; INTRAVENOUS at 10:30

## 2017-03-16 NOTE — OP NOTE
Operative Note    Patient Name:  Elisa Kunz  YOB: 1935  Medical Records Number:  [id number]    Date of Procedure:  3/16/2017    Pre-operative Diagnosis:  Primary Osteoarthritis Right Knee    Post-operative Diagnosis:  Primary Osteoarthritis Right Knee    Procedure Performed:  Right Total Knee Arthroplasty    Implants:  Biomet Vanguard TKA, 65 Femoral Component, 71 Tibial Tray with a 40 mm Modular Stem, 31 3-Peg Patella, 14 std. Polyethylene Insert    Surgeon:  Boris Espinoza M.D.    Assistant: Fabiana Torres (who was present during the critical portions of the case, thereby decreasing operative time and patient morbidity)    Anesthetic Type:  General    Estimated Blood Loss:  250cc's  No Complications      Indications for Procedure:  Elisa Kunz is a 81 y.o. female suffering from end stage degenerative changes in the right knee.  The patients pain is becoming disabling, despite extensive conservative care, including NSAIDS, therapy and injections.  The patient wishes to undergo right total knee arthroplasty.  The risks, benefits and alternatives were discussed and the patient wishes to proceed with total knee arthroplasty.      Procedure Performed:    After informed consent was obtained and pre-operative IV Kefzol given, prior to tourniquet inflation, the patient was taken to the operating room and placed supine on the operating table.  After general anesthesia induced, the patient's right lower extremity was prepped with chloraprep and draped in a sterile fashion.    A midline incision was made overlying the right knee and we sharply dissected down to expose the parapatellar retinaculum.  A mid-vastus, muscle sparing, parapatellar arthrotomy was performed and we elevated the soft tissue both medially and laterally.  The menisci and ACL were excised.  We everted the patella and measured this to be 21 mm and we removed 6 mm of bone down to 15mm.  We measured the patella to be 31  "and drilled our three drill holes.  We protected the patella with the patella protector and turned our attention to the femur and the tibia.    We drilled drill holes into the femoral and the tibial intramedullary canals and proceeded to irrigate the canals to remove the fatty marrow.  We used the intramedullary jourdan and the 5 degree valgus cut block to make our distal femoral cut.  Once we had a smooth surface, we measured the femur to be 65.  We assured we had rotational alignment using the epicondylar axis, then we used the four-in-one cut block to make our anterior, posterior, anterior and posterior chamfer cuts.  We placed our femoral trial, had excellent fit, and extended the knee and marked Patsy's line on the tibia.      We then turned our attention to the tibia, where we irrigated the canal again.  We used the intramedullary jourdan and the 3 degree posterior sloped cut block to remove 2 mm of bone from the effected side of the tibia.  Prior to making our cut, we assured we had rotational alignment using the external guide and [place]'s line.  Once we had a smooth surface, we measured the tibia to be 71.  We then removed soft tissue and bony debris from the posterior aspect of the knee.    We placed our trial implants and had excellent fit, range of motion, stability and ligament balance, throughout a complete arc of motion with a 14 std. polyethylene liner.  We removed our trial implants, punched the tibial keel, copiously irrigated the knee, then cemented our implants in place using palacos cement.  Once the cement cured, we trialed again with the 12, 14 and 16 standard and posterior lipped polyethylene liners.  The  14 std. gave us the best range of motion, stability and ligament balance, throughout a complete arc of motion.  We removed the trials, copiously irrigated the knee, gave IV antibiotics, placed our Rose and local anesthetic, then placed our permanent polyethylene liner.  We placed a 1/8\" " hemovac drain, then closed the arthrotomy with #1 Vicryl pop-off sutures.  The subcutaneous tissue was closed with 2-0 vicryl pop-off sutures.  The skin was closed with staples.  We placed a sterile dressing of Xeroform, 4x4's, abdominal pads, cast padding and an Ace Wrap.  The patient was then awakened from general anesthesia and taken to the recovery room in stable condition.    The patient will be started on Aspirin 325mg twice daily for DVT prophylaxis.  IV antibiotics will be discontinued within 24 hours of surgery.  Immediately prior to surgery, there were no acute Thromboembolic nor Cardiovascular risk factors.  An updated Medical Reconciliation form is on the chart.65

## 2017-03-16 NOTE — ANESTHESIA POSTPROCEDURE EVALUATION
Patient: Elisa Kunz    Procedure Summary     Date Anesthesia Start Anesthesia Stop Room / Location    03/16/17 0658 0859  [facility] /  [facility]       Procedure Diagnosis Surgeon Provider    TOTAL KNEE ARTHROPLASTY (Right Knee) No diagnosis on file. MD Raman Carrillo MD          Anesthesia Type: general  Last vitals  /78 (03/16/17 0935)    Temp 36.5 °C (97.7 °F) (03/16/17 0856)    Pulse 58 (03/16/17 0935)   Resp 16 (03/16/17 0935)    SpO2 96 % (03/16/17 0935)      Post Anesthesia Care and Evaluation    Patient location during evaluation: PACU  Patient participation: complete - patient participated  Level of consciousness: awake  Pain score: 2  Pain management: adequate  Airway patency: patent  Anesthetic complications: No anesthetic complications  PONV Status: none  Cardiovascular status: acceptable  Respiratory status: acceptable  Hydration status: acceptable

## 2017-03-16 NOTE — PLAN OF CARE
Problem: Patient Care Overview (Adult)  Goal: Plan of Care Review    03/16/17 1519   Coping/Psychosocial Response Interventions   Plan Of Care Reviewed With patient;spouse   Outcome Evaluation   Outcome Summary/Follow up Plan Pt demonstrated decreased strength, ROM, and ability to perform functional transfers. Pt would benefit from inpt. physical therapy intervention in order to decrease pain, increase ROM, increase strength, and improve pt's ability to perform functional transfers.         Problem: Inpatient Physical Therapy  Goal: Bed Mobility Goal LTG- PT    03/16/17 1519   Bed Mobility PT LTG   Bed Mobility PT LTG, Date Established 03/16/17   Bed Mobility PT LTG, Time to Achieve 3 days   Bed Mobility PT LTG, [place] Level independent       Goal: Transfer Training Goal 1 LTG- PT    03/16/17 1519   Transfer Training PT LTG   Transfer Training PT LTG, Date Established 03/16/17   Transfer Training PT LTG, Time to Achieve 3 days   Transfer Training PT LTG, Activity Type all transfers   Transfer Training PT LTG, [place] Level conditional independence   Transfer Training PT LTG, Assist Device walker, rolling       Goal: Gait Training Goal LTG- PT    03/16/17 1519   Gait Training PT LTG   Gait Training Goal PT LTG, Date Established 03/16/17   Gait Training Goal PT LTG, Time to Achieve 3 days   Gait Training Goal PT LTG, [place] Level conditional independence   Gait Training Goal PT LTG, Assist Device walker, rolling   Gait Training Goal PT LTG, Distance to Achieve 50 ft       Goal: Range of Motion Goal LTG- PT    03/16/17 1519   Range of Motion PT LTG   Range of Motion Goal PT LTG, Date Established 03/16/17   Range of Motion Goal PT LTG, Time to Achieve 3 days   Range fo Motion Goal PT LTG, Joint R knee   Range of Motion Goal PT LTG, AROM Measure 5 to 90 degrees       Goal: Patient Education Goal LTG- PT    03/16/17 1519   Patient Education PT LTG   Patient Education PT LTG, Date Established 03/16/17    Patient Education PT LTG, Education Type HEP   Patient Education PT LTG, Education Understanding verbalize understanding

## 2017-03-16 NOTE — PLAN OF CARE
Problem: Patient Care Overview (Adult)  Goal: Plan of Care Review  Outcome: Ongoing (interventions implemented as appropriate)    03/16/17 0619   Coping/Psychosocial Response Interventions   Plan Of Care Reviewed With patient   Patient Care Overview   Progress no change   Outcome Evaluation   Outcome Summary/Follow up Plan preparing for OR       Goal: Adult Individualization and Mutuality  Outcome: Ongoing (interventions implemented as appropriate)    03/16/17 0619   Individualization   Patient Specific Preferences prefers to be called Alida       Goal: Discharge Needs Assessment  Outcome: Ongoing (interventions implemented as appropriate)    03/16/17 0619   Discharge Needs Assessment   Concerns To Be Addressed denies needs/concerns at this time   Discharge Planning Comments would like to speak with d/c planner regarding possible rehab placement after d/c   Self-Care   Equipment Currently Used at Home other (see comments)  (has walker at home but has not used it)         Problem: Perioperative Period (Adult)  Goal: Signs and Symptoms of Listed Potential Problems Will be Absent or Manageable (Perioperative Period)  Outcome: Ongoing (interventions implemented as appropriate)    03/16/17 0619   Perioperative Period   Problems Assessed (Perioperative Period) pain;infection;situational response   Problems Present (Perioperative Period) none

## 2017-03-16 NOTE — PROGRESS NOTES
Acute Care - Physical Therapy Initial Evaluation  [facility]     Patient Name: Elisa Kunz  : 1935  MRN: [id number]  Today's Date: 3/16/2017   Onset of Illness/Injury or Date of Surgery Date: 17     Referring Physician: Dr. Boris Espinoza      Admit Date: 3/16/2017     Visit Dx:    ICD-10-CM ICD-9-CM   1. Difficulty walking R26.2 719.7     Patient Active Problem List   Diagnosis   • OA (osteoarthritis) of knee     Past Medical History   Diagnosis Date   • Arthritis    • COPD (chronic obstructive pulmonary disease)    • Edema, lower extremity      BILATERAL    • GERD (gastroesophageal reflux disease)    • GERD (gastroesophageal reflux disease)    • High cholesterol    • History of diverticulitis    • Hyperlipidemia    • Knee pain, bilateral    • Low back pain    • Osteoporosis    • Peripheral venous insufficiency      BLE, HAS WRAPS FROM KNEES TO FEET, SEES [employer]   • Wears hearing aid      Past Surgical History   Procedure Laterality Date   • Hysterectomy     • Cholecystectomy     •  section       X 3   • Foot surgery       ? SIDE, HAS SCREW   • Lumbar spine surgery     • Cataract extraction Bilateral           PT ASSESSMENT (last 72 hours)      PT Evaluation       17 1442 17 0619    Rehab Evaluation    Document Type evaluation  -ALEJANDRA (ligia) DR ALEJANDRA (t) (randi)     Subjective Information agree to therapy;complains of;pain  -ALEJANDRA marino) DR ALEJANDRA keane (t))     Patient Effort, Rehab Treatment good  -ALEJANDRA marino) DR ALEJANDRA (t) (randi)     Symptoms Noted During/After Treatment increased pain  -ALEJANDRA marino) DR ALEJANDRA (t) (randi)     General Information    Onset of Illness/Injury or Date of Surgery Date 17  -ALEJANDRA marino) DR ALEJANDRA keane (t))     Referring Physician Dr. Boris Espinoza  -ALEJANDRA marino) DR ALEJANDRA keane (t))     General Observations On commode  -ALEJANDRA marino) DR ALEJANDRA (t) (randi)     Pertinent History Of Current Problem s/p R TKA  -ALEJANDRA marino) DR ALEJANDRA keane (t))     Precautions/Limitations fall precautions  -ALEJANDRA marino)  DR ALEJANDRA keane (t))     Prior Level of Function independent:  -ALEJANDRA marino) DR ALEJANDRA keane (t))     Equipment Currently Used at Home --   walker  -ALEJANDRA marino) DR ALEJANDRA keane (t)) other (see comments)   has walker at home but has not used it  -SM    Plans/Goals Discussed With patient;spouse/S.O.  -ALEJANDRA keane (r t))     Barriers to Rehab none identified  -ALEJANDRA keane (r t))     Living Environment    Lives With spouse  -ALEJANDRA marino) DR ALEJANDRA ekane (t))     Living Arrangements house  -ALEJANDRA keane (r t))     Home Accessibility stairs to enter home;stairs within home  -ALEJANDRA keane (r t))     Number of Stairs to Enter Home 1  -ALEJANDRA keane (r t))     Type of Financial/Environmental Concern none  -ALEJANDRA keane (r t))     Transportation Available car  -ALEJANDRA keane (r t))     Clinical Impression    Patient/Family Goals Statement Pt wants to d/c to rehab  -ALEJANDRA keane (r t))     Criteria for Skilled Therapeutic Interventions Met treatment indicated  -ALEJANDRA keane (r t))     Impairments Found (describe specific impairments) gait, locomotion, and balance;ROM;muscle performance  -ALEJANDRA keane (r t))     Rehab Potential good, to achieve stated therapy goals  -ALEJANDRA keane (r t))     Pain Assessment    Pain Assessment 0-10  -ALEJANDRA keane (r t))     Pain Score 8  -ALEJANDRA keane (r t))     Pain Type Acute pain;Surgical pain  -ALEJANDRA keane (r t))     Pain Location Knee  -ALEJANDRA keane (r t))     Pain Orientation Right  -ALEJANDRA keane (r t))     Pain Intervention(s) Cold applied;Ambulation/increased activity;Repositioned  -ALEJANDRA keane (r t))     Response to Interventions tolerated  -ALEJANDRA keane (r t))     Cognitive Assessment/Intervention    Current Cognitive/Communication Assessment impaired  -ALEJANDRA keane (r t))     Orientation Status person;place;time  -ALEJANDRA keane (r t))     Follows Commands/Answers Questions 50% of the time  -ALEJANDRA marino) DR ALEJANDRA keane (t))     Personal Safety mild impairment  -ALEJANDRA marino) DR ALEJANDRA keane (t))     Personal Safety Interventions gait  belt;fall prevention program maintained;muscle strengthening facilitated;nonskid shoes/slippers when out of bed  -ALEJANDRA keane (r t))     Additional Documentation --   Pt was disoriented secondary to anesthesia.  -ALEJANDRA keane (r t))     ROM (Range of Motion)    General ROM Detail WFL except R knee (10-70 degrees)  -ALEJANDRA keane (r t))     MMT (Manual Muscle Testing)    General MMT Assessment Detail WFL except R LE  -ALEJANDRA keane (r t))     Bed Mobility, Assessment/Treatment    Bed Mob, Supine to Sit, [place] not tested  -ALEJANDRA keane (r t))     Bed Mob, Sit to Supine, [place] not tested  -ALEJANDRA keane (r t))     Transfer Assessment/Treatment    Transfers, Bed-Chair [place] not tested  -ALEJANDRA keane (r t))     Transfers, Sit-Stand [place] minimum assist (75% patient effort);2 person assist required;verbal cues required  -ALEJANDRA keane (r t))     Transfers, Stand-Sit [place] verbal cues required;nonverbal cues required (demo/gesture);minimum assist (75% patient effort);2 person assist required  -ALEJANDRA keane (r t))     Transfers, Sit-Stand-Sit, Assist Device standard walker  -ALEJANDRA keane (r t))     Toilet Transfer, [place] minimum assist (75% patient effort);1 person + 1 person to manage equipment;verbal cues required  -ALEJANDRA keane (r t))     Toilet Transfer, Assistive Device standard walker  -ALEJANDRA keane (r t))     Transfer, Impairments ROM decreased;strength decreased;impaired balance  -ALEJANDRA keane (r t))     Transfer, Comment Pt required maximal verbal cues for sequencing of functional transfers with walker.  -ALEJANDRA keane (r t))     Gait Assessment/Treatment    Gait, [place] Level minimum assist (75% patient effort);1 person + 1 person to manage equipment  -ALEJANDRA keane (r t))     Gait, Assistive Device standard walker  -ALEJANDRA keane (r t))     Gait, Distance (Feet) 5  -KH (r) DR (t) KH (c)     Gait, Gait Deviations antalgic;lianet decreased;step length  decreased  -ALEJANDRA keane (r t))     Gait, Safety Issues balance decreased during turns;sequencing ability decreased;step length decreased;weight-shifting ability decreased  -ALEJANDRA keane (r t))     Gait, Impairments ROM decreased;strength decreased;impaired balance  -ALEJANDRA keane (r t))     Gait, Comment distance limited by lethargy  -ALEJANDRA     Stairs Assessment/Treatment    Stairs, [place] Level not tested  -ALEJANDRA keane (r t))     Therapy Exercises    Exercise Protocols total knee  -ALEJANDRA keane (r t))     Total Knee Exercises right:;10 reps;completed protocol  -ALEJANDRA keane (r t))     Positioning and Restraints    Pre-Treatment Position bedside commode  -ALEJANDRA     Post Treatment Position chair  -ALEJANDRA keane (r t))     In Chair notified nsg;reclined;sitting;call light within reach;encouraged to call for assist  -ALEJANDRA keane (r t))       03/16/17 8417       General Information    Equipment Currently Used at Home none  -     Living Environment    Lives With spouse  -     Living Arrangements house  -     Home Accessibility stairs within home  -     Stair Railings at Home inside, present on right side  -     Type of Financial/Environmental Concern none  -     Transportation Available car;family or friend will provide  -       User Key  (r) = Recorded By, (t) = Taken By, (c) = Cosigned By    Initials Name Provider Type    ALEJANDRA Cisneros, PT Physical Therapist     Shannan Guzman, RN Registered Nurse    DR Yoshi Fletcher, PT Student PT Student          Physical Therapy Education     Title: PT OT SLP Therapies (Done)     Topic: Physical Therapy (Done)     Point: Mobility training (Done)    Learning Progress Summary    Learner Readiness Method Response Comment Documented by Status   Patient Acceptance E SOUTH,NR   03/16/17 1511 Done               Point: Home exercise program (Done)    Learning Progress Summary    Learner Readiness Method Response Comment Documented by Status   Patient  Acceptance E VU,NR   03/16/17 1518 Done               Point: Body mechanics (Done)    Learning Progress Summary    Learner Readiness Method Response Comment Documented by Status   Patient Acceptance E SOUTH,NR   03/16/17 1518 Done               Point: Precautions (Done)    Learning Progress Summary    Learner Readiness Method Response Comment Documented by Status   Patient Acceptance E SOUTH,NR   03/16/17 1518 Done                      User Key     Initials Effective Dates Name Provider Type Discipline     03/07/17 -  Yoshi Fletcher, PT Student PT Student PT                PT Recommendation and Plan  Anticipated Discharge Disposition: skilled nursing facility  Planned Therapy Interventions: balance training, bed mobility training, gait training, home exercise program, stair training, strengthening  PT Frequency: 2 times/day  Plan of Care Review  Plan Of Care Reviewed With: patient, spouse  Outcome Summary/Follow up Plan: Pt demonstrated decreased strength, ROM, and ability to perform functional transfers. Pt would benefit from inpt. physical therapy intervention in order to decrease pain, increase ROM, increase strength, and improve pt's ability to perform functional transfers.          IP PT Goals       03/16/17 1519          Bed Mobility PT LTG    Bed Mobility PT LTG, Date Established 03/16/17  -ALEJANDRA keane (r t))      Bed Mobility PT LTG, Time to Achieve 3 days  -ALEJANDRA keane (r t))      Bed Mobility PT LTG, [place] Level independent  -ALEJANDRA keane (r t))      Transfer Training PT LTG    Transfer Training PT LTG, Date Established 03/16/17  -ALEJANDRA keane (r t))      Transfer Training PT LTG, Time to Achieve 3 days  -ALEJANDRA keane (r t))      Transfer Training PT LTG, Activity Type all transfers  -ALEJANDRA keane (r t))      Transfer Training PT LTG, [place] Level conditional independence  -ALEJANDRA keane (r t))      Transfer Training PT LTG, Assist Device walker, rolling  -ALEJANDRA keane (r t))      Gait  Training PT LTG    Gait Training Goal PT LTG, Date Established 03/16/17  -ALEJANDRA (ligia) DR ALEJANDRA keane (t))      Gait Training Goal PT LTG, Time to Achieve 3 days  -ALEJANDRA keane (r t))      Gait Training Goal PT LTG, [place] Level conditional independence  -ALEJANDRA (kirt keane (t))      Gait Training Goal PT LTG, Assist Device walker, rolling  -ALEJANDRA (kirt keane (t))      Gait Training Goal PT LTG, Distance to Achieve 50 ft  -ALEJANDRA (kirt keane (t))      Range of Motion PT LTG    Range of Motion Goal PT LTG, Date Established 03/16/17  -ALEJANDRA (ligia) DR ALEJANDRA (t) (randi)      Range of Motion Goal PT LTG, Time to Achieve 3 days  -ALEJANDRA (ligia) DR ALEJANDRA keane (t))      Range fo Motion Goal PT LTG, Joint R knee  -ALEJANDRA (ligia) DR ALEJANDRA keane (t))      Range of Motion Goal PT LTG, AROM Measure 5 to 90 degrees  -ALEJANDRA (kirt keane (t))      Patient Education PT LTG    Patient Education PT LTG, Date Established 03/16/17  -ALEJANDRA (ligia) DR ALEJANDRA (t) (randi)      Patient Education PT LTG, Education Type HEP  -ALEJANDRA (ligia) DR ALEJANDRA keane (t))      Patient Education PT LTG, Education Understanding verbalize understanding  -ALEJANDRA keane (r t))        User Key  (r) = Recorded By, (t) = Taken By, (c) = Cosigned By    Initials Name Provider Type    ALEJANDRA Cisneros, PT Physical Therapist    DR Yoshi Fletcher, PT Student PT Student                Outcome Measures       03/16/17 1500          How much help from another person do you currently need...    Turning from your back to your side while in flat bed without using bedrails? 3  -ALEJANDRA (ligia) DR ALEJANDRA keane (t))      Moving from lying on back to sitting on the side of a flat bed without bedrails? 2  -ALEJANDRA (ligia) DR ALEJANDRA keane (t))      Moving to and from a bed to a chair (including a wheelchair)? 2  -ALEJANDRA (kirt keane (t))      Standing up from a chair using your arms (e.g., wheelchair, bedside chair)? 3  -ALEJANDRA marino) DR ALEJANDRA keane (t))      Climbing 3-5 steps with a railing? 1  -ALEJANDRA marino) DR ALEJANDRA keane (t))      To walk in hospital room? 2  -ALEJANDRA marino) DR ALEJANDRA keane (t))      AM-PAC 6 Clicks Score 13  -ALEJANDRA  (r) DR smith)      Functional Assessment    Outcome Measure Options AM-PAC 6 Clicks Basic Mobility (PT)  -ALEJANDRA (ligia) DR ALEJANDRA (t) (randi)        User Key  (r) = Recorded By, (t) = Taken By, (c) = Cosigned By    Initials Name Provider Type    ALEJANDRA Cisneros, PT Physical Therapist    DR Yoshi Fletcher, PT Student PT Student           Time Calculation:         PT Charges       03/16/17 1523          Time Calculation    Start Time 1446  -ALEJANDRA marino) DR smith) ALEJANDRA (randi)      Stop Time 1507  -ALEJANDRA (ligia) DR ALEJANDRA keane (t))      Time Calculation (min) 21 min  -ALEJANDRA marino) DR smith)      PT Received On 03/16/17  -ALEJANDRA marino) DR ALEJANDRA (t) (randi)      PT - Next Appointment 03/17/17  -ALEJANDRA keane (r t))      PT Goal Re-Cert Due Date 03/19/17  -ALEJANDRA marino) DR ALEJANDRA keane (t))        User Key  (r) = Recorded By, (t) = Taken By, (c) = Cosigned By    Initials Name Provider Type    ALEJANDRA Cisneros, PT Physical Therapist    DR Yoshi Fletcher, PT Student PT Student          Therapy Charges for Today     Code Description Service Date Service Provider Modifiers Qty    84323674664 HC PT EVAL MOD COMPLEXITY 2 3/16/2017 Yoshi Fletcher, PT Student GP 1    71941288240 HC PT THER PROC EA 15 MIN 3/16/2017 Yoshi Fletcher PT Student GP 1    21651579331 HC PT THER SUPP EA 15 MIN 3/16/2017 Yoshi Fletcher, PT Student GP 1          PT G-Codes  Outcome Measure Options: AM-PAC 6 Clicks Basic Mobility (PT)      Yoshi Fletcher, PT Student  3/16/2017

## 2017-03-16 NOTE — H&P
History and Physical    Patient Name:  Elisa Kunz  YOB: 1935    Medical Records Number:  [id number]    Date of Admission:  3/16/2017  5:29 AM    Chief Complaint:  OA (osteoarthritis) of knee [M17.9]    Elisa Kunz is a 81 y.o. female who presents c/o severe right knee pain.  The pain has been on and off for many years, worsening to the point where the pain is becoming disabling.  The pain is a constant dull ache with occasional sharp, stabbing pain.  The patient has failed conservative treatment and would like to proceed with total knee arthroplasty.    Visit Vitals   • /83 (BP Location: Right arm, Patient Position: Lying)   • Pulse 78   • Temp 97.7 °F (36.5 °C) (Oral)   • Resp 16   • LMP  (LMP Unknown)   • SpO2 98%       Past Medical History:    Past Medical History   Diagnosis Date   • Arthritis    • COPD (chronic obstructive pulmonary disease)    • Edema, lower extremity      BILATERAL    • GERD (gastroesophageal reflux disease)    • GERD (gastroesophageal reflux disease)    • High cholesterol    • History of diverticulitis    • Hyperlipidemia    • Knee pain, bilateral    • Low back pain    • Osteoporosis    • Peripheral venous insufficiency      BLE, HAS WRAPS FROM KNEES TO FEET, SEES [employer]   • Wears hearing aid        Social History:    Social History     Social History   • Marital status:      Spouse name: N/A   • Number of children: N/A   • Years of education: N/A     Occupational History   • Not on file.     Social History Main Topics   • Smoking status: Former Smoker   • Smokeless tobacco: Not on file      Comment: QUIT 25 YEARS AGO   • Alcohol use Yes      Comment: OCCASIONAL   • Drug use: No   • Sexual activity: Defer     Other Topics Concern   • Not on file     Social History Narrative       Family History:  History reviewed. No pertinent family history.    Current Medications:    Current Facility-Administered  Medications:   •  ceFAZolin in dextrose (ANCEF) IVPB solution 2 g, 2 g, Intravenous, Once, Boris Espinoza MD  •  celecoxib (CeleBREX) capsule 200 mg, 200 mg, Oral, Once, Boris Espinoza MD, 200 mg at 03/16/17 0622  •  clindamycin (CLEOCIN) 900 mg in dextrose 5% 50 mL IVPB (premix), 900 mg, Intravenous, Once, Boris Espinoza MD  •  famotidine (PEPCID) injection 20 mg, 20 mg, Intravenous, 60 Min Pre-Op, Raman Stokes MD, 20 mg at 03/16/17 0641  •  lactated ringers infusion, 9 mL/hr, Intravenous, Continuous PRN, Raman Stokes MD, Last Rate: 9 mL/hr at 03/16/17 0640, 9 mL/hr at 03/16/17 0640  •  midazolam (VERSED) injection 1 mg, 1 mg, Intravenous, Q5 Min PRN **OR** midazolam (VERSED) injection 1 mg, 1 mg, Intravenous, Q5 Min PRN, Raman Stokes MD, 1 mg at 03/16/17 0641  •  ropivacaine (NAROPIN) 50 mL, Morphine (PF) 5 mg, ketorolac (TORADOL) 30 mg, EPINEPHrine (ADRENALIN) 0.3 mg in sodium chloride 0.9 % 101.8 mL, , Injection, Once, Boris Espinoza MD  •  sodium chloride 0.9 % flush 1-10 mL, 1-10 mL, Intravenous, PRN, Raman Stokes MD    Allergies:  No Known Allergies    Review of Systems:   HEENT: Patient denies any headaches, vision changes, change in hearing, or tinnitus, Patient denies any rhinorrhea,epistaxis, sinus pain, mouth or dental problems, sore throat or hoarseness, or dysphagia  Pulmonary: Patient denies any cough, congestion, SOA, or wheezing  Cardiovascular: Patient denies any chest pain, dyspnea, palpitations, weakness, intolerance of exercise, varicosities, swelling of extremities, known murmur  Gastrointestinal:  Patient denies nausea, vomiting, diarrhea, constipation, loss  of appetite, change in appetite, dysphagia, gas, heartburn, melena, change in bowel habits, use of laxatives or other drugs to alter the function of the gastrointestinal tract.  Genital/Urinary: Patient denies dysuria, change in color of urine, change in frequency of urination, pain with urgency, incontinence,  retention, or nocturia.  Musculoskeletal: Patient denies increased warmth; redness; or swelling of joints; limitation of function; deformity; crepitation: pain in a joint or an extremity, the neck, or the back, especially with movement.  Neurological: Patient denies dizziness, tremor, ataxia, difficulty in speaking, change in speech, paresthesia, loss of sensation, seizures, syncope, changes in memory.  Endocrine system: Patient denies tremors, palpitations, intolerance of heat or cold, polyuria, polydipsia, polyphagia, diaphoresis, exophthalmos, or goiter.  Psychological: Patient denies thoughts/plans or harming self or other; depression,  insomnia, night terrors, triston, memory loss, disorientation.  Skin: Patient denies any bruising, rashes, discoloration, pruritus, wounds, ulcers, decubiti, changes in the hair or nails  Hematopoietic: Patient denies history of spontaneous or excessive bleeding, epistaxis, hematuria, melena, fatigue, enlarged or tender lymph nodes, pallor, history of anemia.      Physical Exam:   Awake, A&O x3, affect normal, no acute distress  Ambulating with a limp due to knee pain  Knee ROM is limited due to pain (5-115)  Strength is 4/5 in the quad, hamstring and calf  Cap refill is normal, Sensation intact    Card:  RR, HD Stable  Pulm:  Regular breathing, no S.O.A  Abd:  Soft, NT, ND    Lab Results (last 24 hours)     ** No results found for the last 24 hours. **          Xr Knee 1 Or 2 View Right    Result Date: 3/2/2017  Narrative: XR KNEE 1 OR 2 VW RIGHT-  INDICATIONS: Preoperative evaluation  TECHNIQUE: FRONTAL AND LATERAL VIEWS OF THE RIGHT KNEE  COMPARISON: None available  FINDINGS:   No acute fracture or erosion is identified. Mild medial tibiofemoral joint space narrowing is seen. Chondral calcification is apparent. Trace knee effusion is suggested.      Impression:  No acute fracture or erosion is identified. Mild medial tibiofemoral joint space narrowing.  This report was finalized  on 3/2/2017 1:45 PM by Dr. Arpit Chambers MD.      Xr Chest Pa & Lateral    Result Date: 3/2/2017  Narrative: XR CHEST PA AND LATERAL-  HISTORY: Female who is 81 years-old,  preoperative evaluation  TECHNIQUE: Frontal and lateral views of the chest  COMPARISON: 06/11/2015  FINDINGS: Heart size is normal. Aorta is tortuous. Old granulomatous disease is seen. Some chronic densities apparent at the left lung base. No acute infiltrate or pleural effusion, or pneumothorax. Pulmonary hyperinflation suggests COPD. Otherwise stable.      Impression: No acute infiltrate. Chronic changes, with COPD, tortuous aorta. Follow-up as clinically indicated.  This report was finalized on 3/2/2017 1:54 PM by Dr. Arpit Chambers MD.        Assessment:  End-stage Primary Right Knee Osteoarthritis    Plan:  Patient's pain is becoming disabling, despite extensive conservative treatment.  Radiographs reveal end-stage degenerative changes.  The risks of surgery, including, but not limited to, heart attack, stroke, dying, DVT, arthrofibrosis and infection were discussed.  The alternatives and benefits were also discussed.  All questions answered and the patient wishes to proceed with right total knee arthroplasty.

## 2017-03-16 NOTE — ANESTHESIA PROCEDURE NOTES
Airway  Urgency: elective    Airway not difficult    General Information and Staff    Patient location during procedure: OR  Anesthesiologist: DOUGIE LOMELI  CRNA: LINN MCCLURE    Indications and Patient Condition  Indications for airway management: airway protection    Preoxygenated: yes  MILS maintained throughout  Mask difficulty assessment: 2 - vent by mask + OA or adjuvant +/- NMBA    Final Airway Details  Final airway type: endotracheal airway      Successful airway: ETT  Cuffed: yes   Successful intubation technique: direct laryngoscopy  Facilitating devices/methods: intubating stylet  Endotracheal tube insertion site: oral  Blade: Trent  Blade size: #2  ETT size: 7.0 mm  Cormack-Lehane Classification: grade I - full view of glottis  Placement verified by: chest auscultation   Cuff volume (mL): 5  Measured from: lips  ETT to lips (cm): 20  Number of attempts at approach: 1    Additional Comments  Pt preoxygenated, SIVI, bag mask vent, ATETI, dentition as before

## 2017-03-16 NOTE — ANESTHESIA PREPROCEDURE EVALUATION
Anesthesia Evaluation     Patient summary reviewed   NPO Status: > 8 hours   Airway   Mallampati: II  Neck ROM: full  no difficulty expected  Dental    (+) edentulous    Pulmonary    (+) COPD,   Cardiovascular     Rhythm: regular        Neuro/Psych  GI/Hepatic/Renal/Endo      Musculoskeletal     Abdominal    Substance History      OB/GYN          Other                                    Anesthesia Plan    ASA 3     general     intravenous induction   Anesthetic plan and risks discussed with patient.  Use of blood products discussed with patient .

## 2017-03-16 NOTE — ANESTHESIA PROCEDURE NOTES
Peripheral Block    Patient location during procedure: holding area  Reason for block: at surgeon's request and post-op pain management  Performed by  Anesthesiologist: DOUGIE LOMELI  Preanesthetic Checklist  Completed: patient identified, site marked, surgical consent, pre-op evaluation, timeout performed, IV checked, risks and benefits discussed and monitors and equipment checked  Peripheral Block Prep:  Sterile barriers:gloves  Prep: ChloraPrep  Patient monitoring: blood pressure monitoring, continuous pulse oximetry and EKG  Peripheral Procedure  Sedation:yes  Guidance:ultrasound guided  Images:still images obtained    Laterality:right  Block Type:adductor canal block  Injection Technique:single-shot  Needle Type:echogenic  ULTRASOUND INTERPRETATION.  Using ultrasound guidance a 21 G gauge needle was placed in close proximity to the femoral nerve, at which point, under ultrasound guidance anesthetic was injected in the area of the nerve and spread of the anesthesia was seen on ultrasound in close proximity thereto.  There were no abnormalities seen on ultrasound; a digital image was taken; and the patient tolerated the procedure with no complications.   Medications  Local Injected:bupivacaine 0.5% with epinephrine Local Amount Injected:20mL  Post Assessment  Injection Assessment: negative aspiration for heme  Patient Tolerance:comfortable throughout block  Complications:no

## 2017-03-16 NOTE — PERIOPERATIVE NURSING NOTE
Notified Dr Espinoza that pt continued taking mobic at home, Urine results from PAT, bilat leg swelling and that right lower leg with slight redness and dry skin.  Instructed to proceed with  Nerve block. Informed Dr Espinoza that I did not give celebrex due to pt taking mobic. No new orders received.

## 2017-03-17 LAB
ANION GAP SERPL CALCULATED.3IONS-SCNC: 13.8 MMOL/L
BUN BLD-MCNC: 22 MG/DL (ref 8–23)
BUN/CREAT SERPL: 14.8 (ref 7–25)
CALCIUM SPEC-SCNC: 8.6 MG/DL (ref 8.6–10.5)
CHLORIDE SERPL-SCNC: 90 MMOL/L (ref 98–107)
CO2 SERPL-SCNC: 24.2 MMOL/L (ref 22–29)
CREAT BLD-MCNC: 1.49 MG/DL (ref 0.57–1)
GFR SERPL CREATININE-BSD FRML MDRD: 34 ML/MIN/1.73
GLUCOSE BLD-MCNC: 128 MG/DL (ref 65–99)
HCT VFR BLD AUTO: 30.2 % (ref 35.6–45.5)
HGB BLD-MCNC: 10.1 G/DL (ref 11.9–15.5)
POTASSIUM BLD-SCNC: 4.6 MMOL/L (ref 3.5–5.2)
SODIUM BLD-SCNC: 128 MMOL/L (ref 136–145)

## 2017-03-17 PROCEDURE — 97150 GROUP THERAPEUTIC PROCEDURES: CPT

## 2017-03-17 PROCEDURE — 80048 BASIC METABOLIC PNL TOTAL CA: CPT | Performed by: ORTHOPAEDIC SURGERY

## 2017-03-17 PROCEDURE — 97110 THERAPEUTIC EXERCISES: CPT

## 2017-03-17 PROCEDURE — 85018 HEMOGLOBIN: CPT | Performed by: ORTHOPAEDIC SURGERY

## 2017-03-17 PROCEDURE — 85014 HEMATOCRIT: CPT | Performed by: ORTHOPAEDIC SURGERY

## 2017-03-17 PROCEDURE — 94799 UNLISTED PULMONARY SVC/PX: CPT

## 2017-03-17 RX ORDER — OXYCODONE HYDROCHLORIDE AND ACETAMINOPHEN 5; 325 MG/1; MG/1
1 TABLET ORAL EVERY 4 HOURS PRN
Status: DISCONTINUED | OUTPATIENT
Start: 2017-03-17 | End: 2017-03-17

## 2017-03-17 RX ORDER — OXYCODONE HYDROCHLORIDE AND ACETAMINOPHEN 5; 325 MG/1; MG/1
1 TABLET ORAL EVERY 4 HOURS PRN
Status: DISCONTINUED | OUTPATIENT
Start: 2017-03-17 | End: 2017-03-19 | Stop reason: HOSPADM

## 2017-03-17 RX ORDER — OXYCODONE HYDROCHLORIDE AND ACETAMINOPHEN 5; 325 MG/1; MG/1
2 TABLET ORAL EVERY 4 HOURS PRN
Status: DISCONTINUED | OUTPATIENT
Start: 2017-03-27 | End: 2017-03-17

## 2017-03-17 RX ORDER — OXYCODONE HYDROCHLORIDE AND ACETAMINOPHEN 5; 325 MG/1; MG/1
2 TABLET ORAL EVERY 4 HOURS PRN
Status: DISCONTINUED | OUTPATIENT
Start: 2017-03-17 | End: 2017-03-19 | Stop reason: HOSPADM

## 2017-03-17 RX ADMIN — BUDESONIDE AND FORMOTEROL FUMARATE DIHYDRATE 2 PUFF: 80; 4.5 AEROSOL RESPIRATORY (INHALATION) at 21:33

## 2017-03-17 RX ADMIN — FERROUS SULFATE TAB 325 MG (65 MG ELEMENTAL FE) 325 MG: 325 (65 FE) TAB at 07:51

## 2017-03-17 RX ADMIN — DOCUSATE SODIUM,SENNOSIDES 2 TABLET: 50; 8.6 TABLET, FILM COATED ORAL at 07:52

## 2017-03-17 RX ADMIN — OXYCODONE HYDROCHLORIDE AND ACETAMINOPHEN 1 TABLET: 5; 325 TABLET ORAL at 21:58

## 2017-03-17 RX ADMIN — DOCUSATE SODIUM,SENNOSIDES 2 TABLET: 50; 8.6 TABLET, FILM COATED ORAL at 17:49

## 2017-03-17 RX ADMIN — FUROSEMIDE 40 MG: 40 TABLET ORAL at 17:49

## 2017-03-17 RX ADMIN — OXYCODONE HYDROCHLORIDE AND ACETAMINOPHEN 1 TABLET: 5; 325 TABLET ORAL at 07:51

## 2017-03-17 RX ADMIN — FUROSEMIDE 40 MG: 40 TABLET ORAL at 07:51

## 2017-03-17 RX ADMIN — MUPIROCIN: 20 OINTMENT TOPICAL at 07:51

## 2017-03-17 RX ADMIN — OXYCODONE HYDROCHLORIDE AND ACETAMINOPHEN 1 TABLET: 5; 325 TABLET ORAL at 14:36

## 2017-03-17 RX ADMIN — CELECOXIB 200 MG: 200 CAPSULE ORAL at 07:52

## 2017-03-17 RX ADMIN — PANTOPRAZOLE SODIUM 40 MG: 40 TABLET, DELAYED RELEASE ORAL at 05:46

## 2017-03-17 RX ADMIN — ACETAMINOPHEN 400 MCG: 400 TABLET ORAL at 07:51

## 2017-03-17 RX ADMIN — BUDESONIDE AND FORMOTEROL FUMARATE DIHYDRATE 2 PUFF: 80; 4.5 AEROSOL RESPIRATORY (INHALATION) at 09:36

## 2017-03-17 RX ADMIN — MUPIROCIN: 20 OINTMENT TOPICAL at 17:49

## 2017-03-17 RX ADMIN — SPIRONOLACTONE 25 MG: 25 TABLET, FILM COATED ORAL at 17:49

## 2017-03-17 RX ADMIN — FENOFIBRATE 145 MG: 145 TABLET ORAL at 07:51

## 2017-03-17 RX ADMIN — PRAVASTATIN SODIUM 40 MG: 40 TABLET ORAL at 07:51

## 2017-03-17 RX ADMIN — ASPIRIN 325 MG: 325 TABLET, DELAYED RELEASE ORAL at 07:51

## 2017-03-17 RX ADMIN — SPIRONOLACTONE 25 MG: 25 TABLET, FILM COATED ORAL at 07:50

## 2017-03-17 RX ADMIN — MONTELUKAST 10 MG: 10 TABLET, FILM COATED ORAL at 21:58

## 2017-03-17 NOTE — PROGRESS NOTES
Discharge Planning Assessment  [facility]     Patient Name: Elisa Kunz  MRN: [id number]  Today's Date: 3/17/2017    Admit Date: 3/16/2017          Discharge Needs Assessment       03/17/17 1610    Living Environment    Lives With spouse    Living Arrangements house    Discharge Needs Assessment    Concerns To Be Addressed basic needs concerns    Readmission Within The Last 30 Days no previous admission in last 30 days    Anticipated Changes Related to Illness inability to care for self    Equipment Currently Used at Home walker, standard    Equipment Needed After Discharge walker, standard;raised toilet    Discharge Facility/Level Of Care Needs nursing facility, skilled    Transportation Available car;family or friend will provide            Discharge Plan       03/17/17 1610    Case Management/Social Work Plan    Plan [facility]    Patient/Family In Agreement With Plan yes    Additional Comments Spoke with pt and family, verified correct information on facesheet and explained the role of CCP. Pt would like to d/c to [facility], referral given to Nichol/Shannan with [facility] who states they are able to accept, precert approved. Plan will be to d/c to SNF skilled. Partial packet on chart. No other needs identified at this time.        Discharge Placement     Facility/Agency Request Status Selected? Address Phone Number Fax Number    [facility]ed    [facility] [address] [facility] [id number] [phone]        Bernice Morocho RN

## 2017-03-17 NOTE — PROGRESS NOTES
"Ortho POD 1    Patients Name:  Elisa Kunz  YOB: 1935  Medical Records Number:  [id number]    No complaints    Visit Vitals   • /72 (BP Location: Left arm, Patient Position: Lying)   • Pulse 76   • Temp 96.9 °F (36.1 °C) (Oral)   • Resp 16   • Ht 60\" (152.4 cm)   • Wt 129 lb (58.5 kg)   • LMP  (LMP Unknown)   • SpO2 97%   • BMI 25.19 kg/m2       RLE:  NVI, calf nontender, sensation intact  No signs of DVT    Incision: clean, intact    Lab Results (last 24 hours)     Procedure Component Value Units Date/Time    Hemoglobin & Hematocrit, Blood [[id number]]  (Abnormal) Collected:  03/17/17 0341    Specimen:  Blood Updated:  03/17/17 0453     Hemoglobin 10.1 (L) g/dL      Hematocrit 30.2 (L) %     Basic Metabolic Panel [[id number]]  (Abnormal) Collected:  03/17/17 0341    Specimen:  Blood Updated:  03/17/17 0459     Glucose 128 (H) mg/dL      BUN 22 mg/dL      Creatinine 1.49 (H) mg/dL      Sodium 128 (L) mmol/L      Potassium 4.6 mmol/L      Chloride 90 (L) mmol/L      CO2 24.2 mmol/L      Calcium 8.6 mg/dL      eGFR Non African Amer 34 (L) mL/min/1.73      BUN/Creatinine Ratio 14.8      Anion Gap 13.8 mmol/L     Narrative:       The MDRD GFR formula is only valid for adults with stable renal function between ages 18 and 70.          Xr Knee 1 Or 2 View Right    Result Date: 3/16/2017  Narrative: XR KNEE 1 OR 2 VW RIGHT-  POSTOP PORTABLE 2 VIEWS RIGHT KNEE  CLINICAL INFORMATION: Post arthroplasty  FINDINGS: Prosthesis is satisfactory in position. A complicating process is not identified.  This report was finalized on 3/16/2017 9:38 AM by Dr. Jose Angel Hoskins MD.      Xr Knee 1 Or 2 View Right    Result Date: 3/2/2017  Narrative: XR KNEE 1 OR 2 VW RIGHT-  INDICATIONS: Preoperative evaluation  TECHNIQUE: FRONTAL AND LATERAL VIEWS OF THE RIGHT KNEE  COMPARISON: None available  FINDINGS:   No acute fracture or erosion is identified. Mild medial tibiofemoral joint space narrowing is seen. Chondral " calcification is apparent. Trace knee effusion is suggested.      Impression:  No acute fracture or erosion is identified. Mild medial tibiofemoral joint space narrowing.  This report was finalized on 3/2/2017 1:45 PM by Dr. Arpit Chambers MD.      Xr Chest Pa & Lateral    Result Date: 3/2/2017  Narrative: XR CHEST PA AND LATERAL-  HISTORY: Female who is 81 years-old,  preoperative evaluation  TECHNIQUE: Frontal and lateral views of the chest  COMPARISON: 06/11/2015  FINDINGS: Heart size is normal. Aorta is tortuous. Old granulomatous disease is seen. Some chronic densities apparent at the left lung base. No acute infiltrate or pleural effusion, or pneumothorax. Pulmonary hyperinflation suggests COPD. Otherwise stable.      Impression: No acute infiltrate. Chronic changes, with COPD, tortuous aorta. Follow-up as clinically indicated.  This report was finalized on 3/2/2017 1:54 PM by Dr. Arpit Chambers MD.        S/p Right TKA  PT-WBAT with walker  ASA for DVT prophylaxis

## 2017-03-17 NOTE — PLAN OF CARE
Problem: Patient Care Overview (Adult)  Goal: Plan of Care Review  Outcome: Ongoing (interventions implemented as appropriate)    03/17/17 6434   Coping/Psychosocial Response Interventions   Plan Of Care Reviewed With patient   Patient Care Overview   Progress improving   Outcome Evaluation   Outcome Summary/Follow up Plan good pain control, vss, plan to go to rehab tomorrow or Sunday, confusion some better       Goal: Adult Individualization and Mutuality  Outcome: Ongoing (interventions implemented as appropriate)    Problem: Perioperative Period (Adult)  Goal: Signs and Symptoms of Listed Potential Problems Will be Absent or Manageable (Perioperative Period)  Outcome: Ongoing (interventions implemented as appropriate)    Problem: Knee Replacement, Total (Adult)  Goal: Signs and Symptoms of Listed Potential Problems Will be Absent or Manageable (Knee Replacement, Total)  Outcome: Ongoing (interventions implemented as appropriate)    Problem: Fall Risk (Adult)  Goal: Absence of Falls  Outcome: Ongoing (interventions implemented as appropriate)

## 2017-03-17 NOTE — PLAN OF CARE
Problem: Patient Care Overview (Adult)  Goal: Plan of Care Review  Outcome: Ongoing (interventions implemented as appropriate)    03/17/17 0326   Coping/Psychosocial Response Interventions   Plan Of Care Reviewed With patient   Patient Care Overview   Progress improving   Outcome Evaluation   Outcome Summary/Follow up Plan patient ambulating with assistance to bathroom and in hallway, states having not pain at this time, patient having some confusion after to surgery of place, time, situation, pleasant and easily redirected, family states this is a baseline for her        Goal: Discharge Needs Assessment  Outcome: Ongoing (interventions implemented as appropriate)    Problem: Perioperative Period (Adult)  Goal: Signs and Symptoms of Listed Potential Problems Will be Absent or Manageable (Perioperative Period)  Outcome: Ongoing (interventions implemented as appropriate)    Problem: Knee Replacement, Total (Adult)  Goal: Signs and Symptoms of Listed Potential Problems Will be Absent or Manageable (Knee Replacement, Total)  Outcome: Ongoing (interventions implemented as appropriate)    Problem: Fall Risk (Adult)  Goal: Identify Related Risk Factors and Signs and Symptoms  Outcome: Outcome(s) achieved Date Met:  03/17/17  Goal: Absence of Falls  Outcome: Ongoing (interventions implemented as appropriate)

## 2017-03-17 NOTE — PROGRESS NOTES
Acute Care - Physical Therapy Treatment Note  [facility]     Patient Name: Elisa Kunz  : 1935  MRN: [id number]  Today's Date: 3/17/2017  Onset of Illness/Injury or Date of Surgery Date: 17     Referring Physician: Dr. Boris Espinoza    Admit Date: 3/16/2017    Visit Dx:    ICD-10-CM ICD-9-CM   1. Difficulty walking R26.2 719.7     Patient Active Problem List   Diagnosis   • OA (osteoarthritis) of knee               Adult Rehabilitation Note       17 0900          Rehab Assessment/Intervention    Discipline physical therapy assistant  -      Document Type therapy note (daily note)  -      Subjective Information agree to therapy;complains of;fatigue;pain  -      Precautions/Limitations fall precautions  -      Recorded by [] Sujata Trent PTA      Pain Assessment    Pain Assessment 0-10  -      Pain Score 5  -      Pain Type Surgical pain  -      Pain Location Knee  -      Pain Orientation Right  -      Pain Intervention(s) Medication (See MAR);Repositioned  -      Response to Interventions royal  -      Recorded by [JM] Sujata Trent PTA      ROM (Range of Motion)    General ROM Detail -  -      Recorded by [JM] Sujata Trent PTA      Bed Mobility, Assessment/Treatment    Bed Mob, Supine to Sit, [place] not tested  -      Recorded by [] Sujata Trent PTA      Transfer Assessment/Treatment    Transfers, Sit-Stand [place] minimum assist (75% patient effort);contact guard assist;verbal cues required  -      Transfers, Stand-Sit [place] contact guard assist;verbal cues required  -      Transfers, Sit-Stand-Sit, Assist Device rolling walker  -      Recorded by [JM] Sujata Trent PTA      Gait Assessment/Treatment    Gait, [place] Level contact guard assist;verbal cues required;nonverbal cues required (demo/gesture)  -      Gait, Assistive Device rolling walker  -      Gait, Distance (Feet) 30  -      Gait,  Gait Deviations step length decreased  -JM      Recorded by [RADHA] Sujata Trent PTA      Therapy Exercises    Exercise Protocols total knee  -      Total Knee Exercises right:;15 reps;completed protocol   son assisted in counting and to keep on task  -      Recorded by [RADHA] Sujata Trent PTA      Positioning and Restraints    Pre-Treatment Position sitting in chair/recliner  -JM      In Chair reclined;call light within reach;encouraged to call for assist;with family/caregiver  -JM      Recorded by [RADHA] Sujata Trent PTA        User Key  (r) = Recorded By, (t) = Taken By, (c) = Cosigned By    Initials Name Effective Dates    RADHA Trent PTA 02/18/16 -                 IP PT Goals       03/16/17 1519          Bed Mobility PT LTG    Bed Mobility PT LTG, Date Established 03/16/17  -ALEJANDRA (r) DR smith) ALEJANDRA (randi)      Bed Mobility PT LTG, Time to Achieve 3 days  -ALEJANDRA (ligia) DR smith) ALEJANDRA (randi)      Bed Mobility PT LTG, [place] Level independent  -ALEJANDRA (ligia) DR smith) ALEJANDRA (randi)      Transfer Training PT LTG    Transfer Training PT LTG, Date Established 03/16/17  -ALEJANDRA (r)  (jerry) ALEJANDRA (randi)      Transfer Training PT LTG, Time to Achieve 3 days  -ALEJANDRA (ligia)  (jerry) ALEJANDRA (randi)      Transfer Training PT LTG, Activity Type all transfers  -ALEJANDRA (r)  (jerry) ALEJANDRA (randi)      Transfer Training PT LTG, [place] Level conditional independence  -ALEJANDRA (ligia) DR smith) ALEJANDRA (randi)      Transfer Training PT LTG, Assist Device walker, rolling  -ALEJANDRA (ligia)  (jerry) ALEJANDRA (randi)      Gait Training PT LTG    Gait Training Goal PT LTG, Date Established 03/16/17  -ALEJANDRA (r)  (jerry) ALEJANDRA (randi)      Gait Training Goal PT LTG, Time to Achieve 3 days  -ALEJANDRA (r)  (jerry) ALEJANDRA (randi)      Gait Training Goal PT LTG, [place] Level conditional independence  -ALEJANDRA (ligia) DR smith) ALEJANDRA (randi)      Gait Training Goal PT LTG, Assist Device walker, rolling  -ALEJANDRA (ligia)  (jerry) ALEJANDRA (randi)      Gait Training Goal PT LTG, Distance to Achieve 50 ft  -ALEJANDRA (r)  (jerry) ALEJANDRA (randi)      Range of Motion PT LTG    Range of Motion Goal PT LTG, Date  Established 03/16/17  -ALEJANDRA (ligia) DR ALEJANDRA keane (t))      Range of Motion Goal PT LTG, Time to Achieve 3 days  -ALEJANDRA (ligia) DR ALEJANDRA keane (t))      Range fo Motion Goal PT LTG, Joint R knee  -ALEJANDRA keane (r t))      Range of Motion Goal PT LTG, AROM Measure 5 to 90 degrees  -ALEJANDRA keane (r t))      Patient Education PT LTG    Patient Education PT LTG, Date Established 03/16/17  -ALEJANDRA keane (r t))      Patient Education PT LTG, Education Type HEP  -ALEJANDRA marino) DR ALEJANDRA keane (t))      Patient Education PT LTG, Education Understanding verbalize understanding  -ALEJANDRA keane (r t))        User Key  (r) = Recorded By, (t) = Taken By, (c) = Cosigned By    Initials Name Provider Type    ALEJANDRA Cisneros, PT Physical Therapist    DR Yoshi Fletcher, PT Student PT Student          Physical Therapy Education     Title: PT OT SLP Therapies (Active)     Topic: Physical Therapy (Active)     Point: Mobility training (Active)    Learning Progress Summary    Learner Readiness Method Response Comment Documented by Status   Patient Acceptance E,TB,D NR cooperative, just forgetful on meds  03/17/17 1612 Active    Acceptance E VU,NR   03/16/17 1518 Done   Family Acceptance E,TB,D NR cooperative, just forgetful on meds  03/17/17 1612 Active               Point: Home exercise program (Active)    Learning Progress Summary    Learner Readiness Method Response Comment Documented by Status   Patient Acceptance E,TB,D NR cooperative, just forgetful on meds  03/17/17 1612 Active    Acceptance E VU,NR   03/16/17 1518 Done   Family Acceptance E,TB,D NR cooperative, just forgetful on meds  03/17/17 1612 Active               Point: Body mechanics (Active)    Learning Progress Summary    Learner Readiness Method Response Comment Documented by Status   Patient Acceptance E,TB,D NR cooperative, just forgetful on meds  03/17/17 1612 Active    Acceptance E VU,NR   03/16/17 1518 Done   Family Acceptance E,TB,D NR cooperative, just forgetful on meds   03/17/17 1612 Active               Point: Precautions (Active)    Learning Progress Summary    Learner Readiness Method Response Comment Documented by Status   Patient Acceptance E,TB,D NR cooperative, just forgetful on meds  03/17/17 1612 Active    Acceptance E VU,NR   03/16/17 1518 Done   Family Acceptance E,TB,D NR cooperative, just forgetful on meds  03/17/17 1612 Active                      User Key     Initials Effective Dates Name Provider Type Discipline     02/18/16 -  Sujata Trent, PTA Physical Therapy Assistant PT     03/07/17 -  Yoshi Fletcher, PT Student PT Student PT                    PT Recommendation and Plan  Anticipated Discharge Disposition: skilled nursing facility  Planned Therapy Interventions: balance training, bed mobility training, gait training, home exercise program, stair training, strengthening  PT Frequency: 2 times/day  Plan of Care Review  Plan Of Care Reviewed With: patient, family  Progress: improving  Outcome Summary/Follow up Plan: incr amb dist          Outcome Measures       03/17/17 1613 03/16/17 1500       How much help from another person do you currently need...    Turning from your back to your side while in flat bed without using bedrails? 3  - 3  -ALEJANDRA (ligia) DR ALEJANDRA keane (t))     Moving from lying on back to sitting on the side of a flat bed without bedrails? 3  - 2  -ALEJANDRA marino) DR ALEJANDRA keane (t))     Moving to and from a bed to a chair (including a wheelchair)? 3  - 2  -ALEJANDRA marino) DR ALEJANDRA keane (t))     Standing up from a chair using your arms (e.g., wheelchair, bedside chair)? 3  - 3  -ALEJANDRA (ligia) DR ALEJANDRA keane (t))     Climbing 3-5 steps with a railing? 2  - 1  -ALEJANDRA marino) DR ALEJANDRA keane (t))     To walk in hospital room? 3  - 2  -ALEJANDRA (ligia) DR ALEJANDRA keane (t))     AM-PAC 6 Clicks Score 17  - 13  -ALEJANDRA smith (r))     Functional Assessment    Outcome Measure Options  AM-PAC 6 Clicks Basic Mobility (PT)  -ALEJANDRA keane (r t))       User Key  (r) = Recorded By, (t) = Taken By, (c) = Cosigned By     Initials Name Provider Type     Melissa Cisneros, PT Physical Therapist    RADHA Trent PTA Physical Therapy Assistant    DR Yoshi Fletcher, PT Student PT Student           Time Calculation:         PT Charges       03/17/17 1613          Time Calculation    Start Time 0840  -      Stop Time 0930  -RADHA      Time Calculation (min) 50 min  -RADHA      PT Received On 03/17/17  -RADHA      PT - Next Appointment 03/17/17  -RADHA        User Key  (r) = Recorded By, (t) = Taken By, (c) = Cosigned By    Initials Name Provider Type    RADHA Trent PTA Physical Therapy Assistant          Therapy Charges for Today     Code Description Service Date Service Provider Modifiers Qty    25728727145 HC PT THER PROC EA 15 MIN 3/17/2017 Sujata Trent PTA GP 2    34516917361 HC PT THER PROC GROUP 3/17/2017 Sujata Trent PTA GP 1          PT G-Codes  Outcome Measure Options: AM-PAC 6 Clicks Basic Mobility (PT)    Sujata Trent PTA  3/17/2017

## 2017-03-17 NOTE — PLAN OF CARE
Problem: Patient Care Overview (Adult)  Goal: Plan of Care Review  Outcome: Ongoing (interventions implemented as appropriate)    03/17/17 1612   Coping/Psychosocial Response Interventions   Plan Of Care Reviewed With patient;family   Patient Care Overview   Progress improving   Outcome Evaluation   Outcome Summary/Follow up Plan incr amb dist

## 2017-03-17 NOTE — PROGRESS NOTES
Acute Care - Physical Therapy Treatment Note  [facility]     Patient Name: Elisa Kunz  : 1935  MRN: [id number]  Today's Date: 3/17/2017  Onset of Illness/Injury or Date of Surgery Date: 17     Referring Physician: Dr. Boris Espinoza    Admit Date: 3/16/2017    Visit Dx:    ICD-10-CM ICD-9-CM   1. Difficulty walking R26.2 719.7     Patient Active Problem List   Diagnosis   • OA (osteoarthritis) of knee               Adult Rehabilitation Note       17 1614 17 0900       Rehab Assessment/Intervention    Discipline physical therapy assistant  -RADHA physical therapy assistant  -     Document Type therapy note (daily note)  - therapy note (daily note)  -     Subjective Information agree to therapy;complains of;pain  - agree to therapy;complains of;fatigue;pain  -     Precautions/Limitations fall precautions  - fall precautions  -     Recorded by [RADHA] Sujata Trent PTA [JM] Sujata Trent PTA     Pain Assessment    Pain Assessment 0-10  - 0-10  -     Pain Score 5  -JM 5  -JM     Pain Type Surgical pain  -JM Surgical pain  -     Pain Location Knee  - Knee  -     Pain Orientation Right  -JM Right  -JM     Pain Intervention(s) Medication (See MAR)  - Medication (See MAR);Repositioned  -     Response to Interventions royal  -JM royal  -JM     Recorded by [JM] Sujata Trent PTA [JM] Sujata Trent PTA     ROM (Range of Motion)    General ROM Detail  -  -     Recorded by  [JM] Sujata Trent PTA     Bed Mobility, Assessment/Treatment    Bed Mob, Supine to Sit, [place] not tested  - not tested  -     Recorded by [JM] Sujata Trent PTA [JM] Sujata Trent PTA     Transfer Assessment/Treatment    Transfers, Sit-Stand [place] minimum assist (75% patient effort);contact guard assist;verbal cues required  - minimum assist (75% patient effort);contact guard assist;verbal cues required  -     Transfers, Stand-Sit [place] contact  guard assist;verbal cues required  - contact guard assist;verbal cues required  -JM     Transfers, Sit-Stand-Sit, Assist Device rolling walker  - rolling walker  -JM     Recorded by [RADHA] Sujata Trent PTA [RADHA] Sujata Trent PTA     Gait Assessment/Treatment    Gait, [place] Level contact guard assist;verbal cues required;nonverbal cues required (demo/gesture)  - contact guard assist;verbal cues required;nonverbal cues required (demo/gesture)  -     Gait, Assistive Device rolling walker  -JM rolling walker  -JM     Gait, Distance (Feet) 40  -JM 30  -JM     Gait, Gait Deviations lianet decreased;step length decreased  - step length decreased  -JM     Recorded by [RADHA] Sujata Trent PTA [RADHA] Sujata Trent PTA     Therapy Exercises    Exercise Protocols total knee  - total knee  -     Total Knee Exercises right:;completed protocol;20 reps   husb assisted in counting and to keep on task  -JM right:;15 reps;completed protocol   son assisted in counting and to keep on task  -JM     Recorded by [RADHA] Sujata Trent PTA [JM] Sujata [employer] PTA     Positioning and Restraints    Pre-Treatment Position sitting in chair/recliner  - sitting in chair/recliner  -JM     In Chair reclined;call light within reach;encouraged to call for assist;with family/caregiver;notified nsg  - reclined;call light within reach;encouraged to call for assist;with family/caregiver  -     Recorded by [RADHA] Sujata Trent PTA [JM] Sujata Trent PTA       User Key  (r) = Recorded By, (t) = Taken By, (c) = Cosigned By    Initials Name Effective Dates    RADHA Trent PTA 02/18/16 -                 IP PT Goals       03/16/17 1519          Bed Mobility PT LTG    Bed Mobility PT LTG, Date Established 03/16/17  -ALEJANDRA (ligia) DR ALEJANDRA keane (t))      Bed Mobility PT LTG, Time to Achieve 3 days  -ALEJANDRA marino) DR ALEJANDRA keane (t))      Bed Mobility PT LTG, [place] Level independent  -ALEJANDRA marino) DR ALEJANDRA keane (t))      Transfer Training PT LTG     Transfer Training PT LTG, Date Established 03/16/17  -ALEJANDRA (ligia) DR ALEJANDRA keane (t))      Transfer Training PT LTG, Time to Achieve 3 days  -ALEJANDRA (kirt keane (t))      Transfer Training PT LTG, Activity Type all transfers  -ALEJANDRA (r) DR ALEJANDRA keane (t))      Transfer Training PT LTG, [place] Level conditional independence  -ALEJANDRA (kirt keane (t))      Transfer Training PT LTG, Assist Device walker, rolling  -ALEJANDRA (ligia) DR ALEJANDRA keane (t))      Gait Training PT LTG    Gait Training Goal PT LTG, Date Established 03/16/17  -ALEJANDRA (ligia) DR ALEJANDRA keane (t))      Gait Training Goal PT LTG, Time to Achieve 3 days  -ALEJANDRA (ligia) DR ALEJANDRA keane (t))      Gait Training Goal PT LTG, [place] Level conditional independence  -ALEJANDRA (ligia) DR ALEJANDRA keane (t))      Gait Training Goal PT LTG, Assist Device walker, rolling  -ALEJANDRA (ligia) DR ALEJANDRA keane (t))      Gait Training Goal PT LTG, Distance to Achieve 50 ft  -ALEJANDRA (ligia) DR ALEJANDRA (t) (randi)      Range of Motion PT LTG    Range of Motion Goal PT LTG, Date Established 03/16/17  -ALEJANDRA (kirt keane (t))      Range of Motion Goal PT LTG, Time to Achieve 3 days  -ALEJANDRA (ligia) DR ALEJANDRA keane (t))      Range fo Motion Goal PT LTG, Joint R knee  -ALEJANDRA (ligia) DR ALEJANDRA keane (t))      Range of Motion Goal PT LTG, AROM Measure 5 to 90 degrees  -ALEJANDRA (kirt keane (t))      Patient Education PT LTG    Patient Education PT LTG, Date Established 03/16/17  -ALEJANDRA keane (r t))      Patient Education PT LTG, Education Type HEP  -ALEJANDRA (kirt keane (t))      Patient Education PT LTG, Education Understanding verbalize understanding  -ALEJANDRA keane (r t))        User Key  (r) = Recorded By, (t) = Taken By, (c) = Cosigned By    Initials Name Provider Type    ALEJANDRA Cisneros, PT Physical Therapist    DR Yoshi Fletcher, PT Student PT Student          Physical Therapy Education     Title: PT OT SLP Therapies (Active)     Topic: Physical Therapy (Active)     Point: Mobility training (Active)    Learning Progress Summary    Learner Readiness Method Response Comment Documented by Status   Patient  Acceptance E,TB,D NR cooperative, just forgetful on meds  03/17/17 1612 Active    Acceptance E VU,NR   03/16/17 1518 Done   Family Acceptance E,TB,D NR cooperative, just forgetful on meds  03/17/17 1612 Active               Point: Home exercise program (Active)    Learning Progress Summary    Learner Readiness Method Response Comment Documented by Status   Patient Acceptance E,TB,D NR cooperative, just forgetful on meds  03/17/17 1612 Active    Acceptance E VU,NR   03/16/17 1518 Done   Family Acceptance E,TB,D NR cooperative, just forgetful on meds  03/17/17 1612 Active               Point: Body mechanics (Active)    Learning Progress Summary    Learner Readiness Method Response Comment Documented by Status   Patient Acceptance E,TB,D NR cooperative, just forgetful on meds  03/17/17 1612 Active    Acceptance E VU,NR   03/16/17 1518 Done   Family Acceptance E,TB,D NR cooperative, just forgetful on meds  03/17/17 1612 Active               Point: Precautions (Active)    Learning Progress Summary    Learner Readiness Method Response Comment Documented by Status   Patient Acceptance E,TB,D NR cooperative, just forgetful on meds  03/17/17 1612 Active    Acceptance E VU,NR   03/16/17 1518 Done   Family Acceptance E,TB,D NR cooperative, just forgetful on meds  03/17/17 1612 Active                      User Key     Initials Effective Dates Name Provider Type Discipline     02/18/16 -  Sujata Trent, PTA Physical Therapy Assistant PT     03/07/17 -  Yoshi Fletcher PT Student PT Student PT                    PT Recommendation and Plan  Anticipated Discharge Disposition: skilled nursing facility  Planned Therapy Interventions: balance training, bed mobility training, gait training, home exercise program, stair training, strengthening  PT Frequency: 2 times/day  Plan of Care Review  Plan Of Care Reviewed With: patient, family  Progress: improving  Outcome Summary/Follow up Plan: incr amb dist           Outcome Measures       03/17/17 1613 03/16/17 1500       How much help from another person do you currently need...    Turning from your back to your side while in flat bed without using bedrails? 3  -JM 3  -KH (r)  (jerry) ALEJANDRA (c)     Moving from lying on back to sitting on the side of a flat bed without bedrails? 3  -JM 2  -KH (r) DR smith) ALEJANDRA (c)     Moving to and from a bed to a chair (including a wheelchair)? 3  -JM 2  -KH (r)  (jerry) ALEJANDRA (randi)     Standing up from a chair using your arms (e.g., wheelchair, bedside chair)? 3  -JM 3  -KH (r)  (jerry) ALEJANDRA (randi)     Climbing 3-5 steps with a railing? 2  -JM 1  -ALEJANDRA (r) DR smith) ALEJANDRA (randi)     To walk in hospital room? 3  -JM 2  -ALEJANDRA (r)  (jerry) ALEJANDRA (randi)     AM-[facility] 6 Clicks Score 17  - 13  -ALEJANDRA (r)  (jerry)     Functional Assessment    Outcome Measure Options  AM-PAC 6 Clicks Basic Mobility (PT)  -ALEJANDRA (r)  (jerry) ALEJANDRA (c)       User Key  (r) = Recorded By, (t) = Taken By, (c) = Cosigned By    Initials Name Provider Type     Melissa Cisneros, PT Physical Therapist    RADHA Trent PTA Physical Therapy Assistant    DR Yoshi Fletcher, PT Student PT Student           Time Calculation:         PT Charges       03/17/17 1619 03/17/17 1613       Time Calculation    Start Time 1300  - 0840  -     Stop Time 1340  - 0930  -     Time Calculation (min) 40 min  - 50 min  -     PT Received On 03/17/17  - 03/17/17  -RADHA     PT - Next Appointment 03/18/17  - 03/17/17  -       User Key  (r) = Recorded By, (t) = Taken By, (c) = Cosigned By    Initials Name Provider Type    RADHA Trent PTA Physical Therapy Assistant          Therapy Charges for Today     Code Description Service Date Service Provider Modifiers Qty    24835300281 HC PT THER PROC EA 15 MIN 3/17/2017 Sujata Trent PTA GP 2    89133251251 HC PT THER PROC GROUP 3/17/2017 Sujata Trent PTA GP 1    52650822854 HC PT THER PROC EA 15 MIN 3/17/2017 Sujata Trent PTA GP 1    12118135639  PT THER PROC GROUP  3/17/2017 Sujata Trent, PTA GP 1          PT G-Codes  Outcome Measure Options: AM-PAC 6 Clicks Basic Mobility (PT)    Sujata Trent PTA  3/17/2017

## 2017-03-18 LAB
HCT VFR BLD AUTO: 29.6 % (ref 35.6–45.5)
HGB BLD-MCNC: 10.3 G/DL (ref 11.9–15.5)

## 2017-03-18 PROCEDURE — 97150 GROUP THERAPEUTIC PROCEDURES: CPT

## 2017-03-18 PROCEDURE — 85014 HEMATOCRIT: CPT | Performed by: ORTHOPAEDIC SURGERY

## 2017-03-18 PROCEDURE — 85018 HEMOGLOBIN: CPT | Performed by: ORTHOPAEDIC SURGERY

## 2017-03-18 PROCEDURE — 63710000001 DIPHENHYDRAMINE PER 50 MG: Performed by: ORTHOPAEDIC SURGERY

## 2017-03-18 PROCEDURE — 97110 THERAPEUTIC EXERCISES: CPT

## 2017-03-18 RX ORDER — OXYCODONE HYDROCHLORIDE AND ACETAMINOPHEN 5; 325 MG/1; MG/1
1 TABLET ORAL EVERY 4 HOURS PRN
Qty: 80 TABLET | Refills: 0 | Status: SHIPPED | OUTPATIENT
Start: 2017-03-18 | End: 2017-03-27

## 2017-03-18 RX ORDER — PSEUDOEPHEDRINE HCL 30 MG
100 TABLET ORAL 2 TIMES DAILY PRN
Qty: 40 CAPSULE | Refills: 0 | Status: ON HOLD | OUTPATIENT
Start: 2017-03-18 | End: 2018-10-26

## 2017-03-18 RX ORDER — ONDANSETRON 4 MG/1
4 TABLET, FILM COATED ORAL EVERY 6 HOURS PRN
Qty: 40 TABLET | Refills: 0 | Status: SHIPPED | OUTPATIENT
Start: 2017-03-18 | End: 2018-11-26

## 2017-03-18 RX ADMIN — FERROUS SULFATE TAB 325 MG (65 MG ELEMENTAL FE) 325 MG: 325 (65 FE) TAB at 10:15

## 2017-03-18 RX ADMIN — FUROSEMIDE 40 MG: 40 TABLET ORAL at 10:15

## 2017-03-18 RX ADMIN — DOCUSATE SODIUM,SENNOSIDES 2 TABLET: 50; 8.6 TABLET, FILM COATED ORAL at 10:15

## 2017-03-18 RX ADMIN — PRAVASTATIN SODIUM 40 MG: 40 TABLET ORAL at 11:11

## 2017-03-18 RX ADMIN — DOCUSATE SODIUM,SENNOSIDES 2 TABLET: 50; 8.6 TABLET, FILM COATED ORAL at 18:06

## 2017-03-18 RX ADMIN — OXYCODONE HYDROCHLORIDE AND ACETAMINOPHEN 1 TABLET: 5; 325 TABLET ORAL at 23:08

## 2017-03-18 RX ADMIN — FENOFIBRATE 145 MG: 145 TABLET ORAL at 10:15

## 2017-03-18 RX ADMIN — DIPHENHYDRAMINE HYDROCHLORIDE 50 MG: 25 CAPSULE ORAL at 16:00

## 2017-03-18 RX ADMIN — ACETAMINOPHEN 400 MCG: 400 TABLET ORAL at 10:15

## 2017-03-18 RX ADMIN — OXYCODONE HYDROCHLORIDE AND ACETAMINOPHEN 1 TABLET: 5; 325 TABLET ORAL at 04:17

## 2017-03-18 RX ADMIN — PANTOPRAZOLE SODIUM 40 MG: 40 TABLET, DELAYED RELEASE ORAL at 06:46

## 2017-03-18 RX ADMIN — FUROSEMIDE 40 MG: 40 TABLET ORAL at 18:06

## 2017-03-18 RX ADMIN — MONTELUKAST 10 MG: 10 TABLET, FILM COATED ORAL at 21:00

## 2017-03-18 RX ADMIN — SPIRONOLACTONE 25 MG: 25 TABLET, FILM COATED ORAL at 18:06

## 2017-03-18 RX ADMIN — ASPIRIN 325 MG: 325 TABLET, DELAYED RELEASE ORAL at 10:15

## 2017-03-18 RX ADMIN — SPIRONOLACTONE 25 MG: 25 TABLET, FILM COATED ORAL at 10:15

## 2017-03-18 RX ADMIN — CELECOXIB 200 MG: 200 CAPSULE ORAL at 10:15

## 2017-03-18 RX ADMIN — OXYCODONE HYDROCHLORIDE AND ACETAMINOPHEN 2 TABLET: 5; 325 TABLET ORAL at 10:10

## 2017-03-18 NOTE — PROGRESS NOTES
Acute Care - Physical Therapy Treatment Note  [facility]     Patient Name: Elisa Kunz  : 1935  MRN: [id number]  Today's Date: 3/18/2017  Onset of Illness/Injury or Date of Surgery Date: 17     Referring Physician: Dr. Boris Espinoza    Admit Date: 3/16/2017    Visit Dx:    ICD-10-CM ICD-9-CM   1. Difficulty walking R26.2 719.7     Patient Active Problem List   Diagnosis   • OA (osteoarthritis) of knee               Adult Rehabilitation Note       17 0830 17 1614 17 0900    Rehab Assessment/Intervention    Discipline physical therapist  -INDRA physical therapy assistant  -JM physical therapy assistant  -    Document Type therapy note (daily note)  -INDRA therapy note (daily note)  - therapy note (daily note)  -    Subjective Information agree to therapy  -INDRA agree to therapy;complains of;pain  - agree to therapy;complains of;fatigue;pain  -JM    Patient Effort, Rehab Treatment good  -INDRA      Precautions/Limitations  fall precautions  - fall precautions  -    Recorded by [INDRA] Nicci Jovel, PT [JM] Sujata Trent, PTA [JM] Sujata Trent, PTA    Pain Assessment    Pain Assessment 0-10  -INDRA 0-10  -JM 0-10  -JM    Pain Score 3  -INDRA 5  -JM 5  -JM    Pain Type  Surgical pain  -JM Surgical pain  -JM    Pain Location Knee  -INDRA Knee  -JM Knee  -JM    Pain Orientation Right  -INDRA Right  -JM Right  -JM    Pain Intervention(s)  Medication (See MAR)  -JM Medication (See MAR);Repositioned  -JM    Response to Interventions  royal  -JM royal  -JM    Recorded by [INDRA] Ncici Jovel, PT [JM] Sujata Trent, PTA [JM] Sujata Trent, PTA    Cognitive Assessment/Intervention    Current Cognitive/Communication Assessment functional  -INDRA      Orientation Status oriented x 4  -INDRA      Personal Safety WNL/WFL  -INDRA      Personal Safety Interventions fall prevention program maintained;gait belt;muscle strengthening facilitated;nonskid shoes/slippers when out of bed  -INDRA      Recorded  by [INDRA] Nicci Jovel PT      ROM (Range of Motion)    General ROM Detail 20-90  -INDRA  -7-97  -    Recorded by [INDRA] Nicci Jovel, PT  [JM] Sujata Trent PTA    Bed Mobility, Assessment/Treatment    Bed Mob, Supine to Sit, [place] contact guard assist;supervision required  -INDRA not tested  -JM not tested  -    Bed Mob, Sit to Supine, [place] contact guard assist;supervision required  -INDRA      Recorded by [INDRA] Nicci Jovel, PT [JM] Sujata Trent PTA [JM] Sujata Trent PTA    Transfer Assessment/Treatment    Transfers, Sit-Stand [place] contact guard assist;verbal cues required  -INDRA minimum assist (75% patient effort);contact guard assist;verbal cues required  - minimum assist (75% patient effort);contact guard assist;verbal cues required  -    Transfers, Stand-Sit [place] contact guard assist;verbal cues required  - contact guard assist;verbal cues required  - contact guard assist;verbal cues required  -    Transfers, Sit-Stand-Sit, Assist Device rolling walker  -INDRA rolling walker  -JM rolling walker  -    Recorded by [INDRA] Nicci Jovel, PT [JM] Sujata Trent PTA [JM] Sujata Trent PTA    Gait Assessment/Treatment    Gait, [place] Level contact guard assist;verbal cues required  - contact guard assist;verbal cues required;nonverbal cues required (demo/gesture)  - contact guard assist;verbal cues required;nonverbal cues required (demo/gesture)  -    Gait, Assistive Device rolling walker  -INDRA rolling walker  - rolling walker  -    Gait, Distance (Feet) 100  -INDRA 40  -JM 30  -JM    Gait, Gait Deviations antalgic;lianet decreased;forward flexed posture  -INDRA lianet decreased;step length decreased  - step length decreased  -    Recorded by [INDRA] Nicci Jovel, PT [JM] Sujata Trent PTA [JM] Sujata Trent PTA    Therapy Exercises    Exercise Protocols total knee  -INDRA total knee  -JM total knee  -JM    Total Knee Exercises  right:;completed protocol;25 reps  -INDRA right:;completed protocol;20 reps   husb assisted in counting and to keep on task  -JM right:;15 reps;completed protocol   son assisted in counting and to keep on task  -JM    Recorded by [INDRA] Nicci Jovel, PT [JM] Sujata Trent PTA [JM] Sujata Trent PTA    Positioning and Restraints    Pre-Treatment Position sitting in chair/recliner  -INDRA sitting in chair/recliner  -JM sitting in chair/recliner  -JM    Post Treatment Position chair  -INDRA      In Chair reclined;call light within reach;with family/caregiver  -INDRA reclined;call light within reach;encouraged to call for assist;with family/caregiver;notified nsg  -JM reclined;call light within reach;encouraged to call for assist;with family/caregiver  -JM    Recorded by [INDRA] Nicci Jovel, PT [JM] Sujata Tretn, PTA [JM] Sujata Trent PTA      User Key  (r) = Recorded By, (t) = Taken By, (c) = Cosigned By    Initials Name Effective Dates    INDRA Nicci Jovel, PT 10/06/15 -     JM Sujata Trent, PTA 02/18/16 -                 IP PT Goals       03/16/17 1519          Bed Mobility PT LTG    Bed Mobility PT LTG, Date Established 03/16/17  -ALEJANDRA (kirt keane (t))      Bed Mobility PT LTG, Time to Achieve 3 days  -ALEJANDRA (ligia) DR ALEJANDRA (t) (randi)      Bed Mobility PT LTG, [place] Level independent  -ALEJANDRA (ligia) DR ALEJANDRA keane (t))      Transfer Training PT LTG    Transfer Training PT LTG, Date Established 03/16/17  -ALEJANDRA (kirt keane (t))      Transfer Training PT LTG, Time to Achieve 3 days  -ALEJANDRA (kirt keane (t))      Transfer Training PT LTG, Activity Type all transfers  -ALEJANDRA (kirt keane (t))      Transfer Training PT LTG, [place] Level conditional independence  -ALEJANDRA keane (r t))      Transfer Training PT LTG, Assist Device walker, rolling  -ALEJANDRA (kirt keane (t))      Gait Training PT LTG    Gait Training Goal PT LTG, Date Established 03/16/17  -ALEJANDRA marino) DR Boudreauxt) ALEJANDRA (randi)      Gait Training Goal PT LTG, Time to Achieve 3 days  -ALEJANDRA marino)  DR ALEJANDRA keane (t))      Gait Training Goal PT LTG, [place] Level conditional independence  -ALEJANDRA (r) DR ALEJANDRA keane (t))      Gait Training Goal PT LTG, Assist Device walker, rolling  -ALEJANDRA (ligia) DR ALEJANDRA keane (t))      Gait Training Goal PT LTG, Distance to Achieve 50 ft  -ALEJANDRA (ligia) DR ALEJANDRA keane (t))      Range of Motion PT LTG    Range of Motion Goal PT LTG, Date Established 03/16/17  -ALEJANDRA (ligia) DR ALEJANDRA keane (t))      Range of Motion Goal PT LTG, Time to Achieve 3 days  -ALEJANDRA (r) DR ALEJANDRA keane (t))      Range fo Motion Goal PT LTG, Joint R knee  -ALEJANDRA (ligia) DR ALEJANDRA keane (t))      Range of Motion Goal PT LTG, AROM Measure 5 to 90 degrees  -ALEJANDRA marino) DR ALEJANDRA keane (t))      Patient Education PT LTG    Patient Education PT LTG, Date Established 03/16/17  -ALEJANDRA (ligia) DR ALEJANDRA keane (t))      Patient Education PT LTG, Education Type HEP  -ALEJANDRA (ligia) DR ALEJANDRA keane (t))      Patient Education PT LTG, Education Understanding verbalize understanding  -ALEJANDRA (ligia) DR ALEJANDRA (t) (ranid)        User Key  (r) = Recorded By, (t) = Taken By, (c) = Cosigned By    Initials Name Provider Type    ALEJANDRA Cisneros, PT Physical Therapist    DR Yoshi Fletcher, PT Student PT Student          Physical Therapy Education     Title: PT OT SLP Therapies (Active)     Topic: Physical Therapy (Active)     Point: Mobility training (Active)    Learning Progress Summary    Learner Readiness Method Response Comment Documented by Status   Patient Acceptance E SOUTH,NR  INDRA 03/18/17 1359 Done    Acceptance E,TB,D NR cooperative, just forgetful on meds  03/17/17 1612 Active    Acceptance E STANLEY DIA DR 03/16/17 1518 Done   [facility] Accept[facility]TB,D NR [facility], [facility] Active               Point: Home exercise program (Active)    Learning Progress Summary    Learner Readiness Method Response Comment Documented by Status   Patient Acceptance E SOUTH,NR  INDRA 03/18/17 1359 Done    Acceptance E,TB,D NR cooperative, just forgetful on meds  03/17/17 1612 Active    Acceptance E STANLEY DIA DR 03/16/17 1518 Done    Family Acceptance E,TB,D NR cooperative, just forgetful on meds  03/17/17 1612 Active               Point: Body mechanics (Active)    Learning Progress Summary    Learner Readiness Method Response Comment Documented by Status   Patient Acceptance E,TB,D NR cooperative, just forgetful on meds  03/17/17 1612 Active    Acceptance E VU,NR   03/16/17 1518 Done   Family Acceptance E,TB,D NR cooperative, just forgetful on meds  03/17/17 1612 Active               Point: Precautions (Active)    Learning Progress Summary    Learner Readiness Method Response Comment Documented by Status   Patient Acceptance E,TB,D NR cooperative, just forgetful on meds  03/17/17 1612 Active    Acceptance E VU,NR   03/16/17 1518 Done   Family Acceptance E,TB,D NR cooperative, just forgetful on meds  03/17/17 1612 Active                      User Key     Initials Effective Dates Name Provider Type Discipline     10/06/15 -  Nicci Jovel, PT Physical Therapist PT     02/18/16 -  Sujata Trent, PTA Physical Therapy Assistant PT     03/07/17 -  Yoshi Fletcher, PT Student PT Student PT                    PT Recommendation and Plan  Anticipated Discharge Disposition: skilled nursing facility  Planned Therapy Interventions: balance training, bed mobility training, gait training, home exercise program, stair training, strengthening  PT Frequency: 2 times/day  Plan of Care Review  Plan Of Care Reviewed With: patient  Progress: progress toward functional goals as expected          Outcome Measures       03/18/17 1400 03/17/17 1613 03/16/17 1500    How much help from another person do you currently need...    Turning from your back to your side while in flat bed without using bedrails? 3  -INDRA 3  - 3  -KH (r) DR smith) ALEJANDRA (randi)    Moving from lying on back to sitting on the side of a flat bed without bedrails? 3  -INDRA 3  - 2  -KH (r) DR smith) ALEJANDRA (randi)    Moving to and from a bed to a chair (including a wheelchair)? 3  -INDRA 3  - 2  -KH  (r) DR ALEJANDRA (t) (randi)    Standing up from a chair using your arms (e.g., wheelchair, bedside chair)? 3  -INDRA 3  -JM 3  -ALEJANDRA (ligia) DR ALEJANDRA keane (t))    Climbing 3-5 steps with a railing? 2  -INDRA 2  -JM 1  -ALEJANDRA (ligia) DR ALEJANDRA keane (t))    To walk in hospital room? 3  -INDRA 3  -JM 2  -ALEJANDRA (ligia) DR smith) ALEJANDRA keane)    AM-PAC 6 Clicks Score 17  -INDRA 17  -JM 13  -ALEJANDRA (ligia) DR smith)    Functional Assessment    Outcome Measure Options AM-PAC 6 Clicks Basic Mobility (PT)  -INDRA  AM-PAC 6 Clicks Basic Mobility (PT)  -ALEJANDRA marino) DR smith) ALEJANDRA (randi)      User Key  (r) = Recorded By, (t) = Taken By, (c) = Cosigned By    Initials Name Provider Type     Melissa Cisneros, PT Physical Therapist    INDRA Jovel, PT Physical Therapist    RADHA Trent, PTA Physical Therapy Assistant    DR Yoshi Fletcher, PT Student PT Student           Time Calculation:         PT Charges       03/18/17 1401          Time Calculation    Start Time 0830  -INDRA      Stop Time 0930  -INDRA      Time Calculation (min) 60 min  -INDRA      PT Received On 03/18/17  -        User Key  (r) = Recorded By, (t) = Taken By, (c) = Cosigned By    Initials Name Provider Type    INDRA Jovel, PT Physical Therapist          Therapy Charges for Today     Code Description Service Date Service Provider Modifiers Qty    73816089694 HC PT THER PROC GROUP 3/18/2017 Nicci Jovel, PT GP 1    81118767358 HC PT THER PROC EA 15 MIN 3/18/2017 Nicci Jovel, PT GP 1          PT G-Codes  Outcome Measure Options: AM-PAC 6 Clicks Basic Mobility (PT)    Nicci Jovel, PT  3/18/2017

## 2017-03-18 NOTE — PLAN OF CARE
Problem: Patient Care Overview (Adult)  Goal: Plan of Care Review  Outcome: Ongoing (interventions implemented as appropriate)    03/18/17 0440   Coping/Psychosocial Response Interventions   Plan Of Care Reviewed With patient   Patient Care Overview   Progress progress toward functional goals as expected   Outcome Evaluation   Outcome Summary/Follow up Plan Pt is a post op day 1 of a rt total knee. Pt continues with PRN pain meds that provide some relief. Pt had some moments of confusion this shift but was quickly reoriented. Pt has been up to bathroom with an assist of 1. Pt is resting at this time.       Goal: Adult Individualization and Mutuality  Outcome: Ongoing (interventions implemented as appropriate)  Goal: Discharge Needs Assessment  Outcome: Ongoing (interventions implemented as appropriate)    Problem: Perioperative Period (Adult)  Goal: Signs and Symptoms of Listed Potential Problems Will be Absent or Manageable (Perioperative Period)  Outcome: Ongoing (interventions implemented as appropriate)    Problem: Knee Replacement, Total (Adult)  Goal: Signs and Symptoms of Listed Potential Problems Will be Absent or Manageable (Knee Replacement, Total)  Outcome: Ongoing (interventions implemented as appropriate)    Problem: Fall Risk (Adult)  Goal: Absence of Falls  Outcome: Ongoing (interventions implemented as appropriate)

## 2017-03-18 NOTE — PLAN OF CARE
Problem: Patient Care Overview (Adult)  Goal: Plan of Care Review  Outcome: Ongoing (interventions implemented as appropriate)    03/18/17 1400 03/18/17 1828   Coping/Psychosocial Response Interventions   Plan Of Care Reviewed With patient --    Patient Care Overview   Progress progress toward functional goals as expected --    Outcome Evaluation   Outcome Summary/Follow up Plan --  Pt experienced some confusion from baseline dementia this afternoon. She ambulated with PT and staff. Pain controlled with PO meds. The pt's right bottom lip became swollen this afternoon after eating pineapple. Administered benadryl and swollen subsided.       Goal: Adult Individualization and Mutuality  Outcome: Ongoing (interventions implemented as appropriate)  Goal: Discharge Needs Assessment  Outcome: Ongoing (interventions implemented as appropriate)    Problem: Perioperative Period (Adult)  Goal: Signs and Symptoms of Listed Potential Problems Will be Absent or Manageable (Perioperative Period)  Outcome: Ongoing (interventions implemented as appropriate)    Problem: Knee Replacement, Total (Adult)  Goal: Signs and Symptoms of Listed Potential Problems Will be Absent or Manageable (Knee Replacement, Total)  Outcome: Ongoing (interventions implemented as appropriate)    Problem: Fall Risk (Adult)  Goal: Absence of Falls  Outcome: Ongoing (interventions implemented as appropriate)

## 2017-03-18 NOTE — DISCHARGE SUMMARY
Discharge Summary    Patient Name:  Elisa Kunz  YOB: 1935  Medical Records Number:  [id number]    Date of Admission:  3/16/2017  Date of Discharge:  3/19/2017    Primary Discharge Diagnosis:  OA (osteoarthritis) of knee [M17.9]    Secondary Discharge Diagnosis:    Problem List Items Addressed This Visit     None          Procedures Performed:  Right Total Knee Arthroplasty      Hospital Course:    Elisa Kunz is a 81 y.o.  female who underwent successful right tka on 3/16/2017.  Elisa Kunz was started on Aspirin 325mg po twice daily immediately post-operatively for DVT prophylaxis.  On post-op day 1 the patients dressing was changed and their incision was clean, with no signs of infection and their calf was soft, with no signs of DVT.  The patient progressed well with physical therapy and the patients hemoglobin remained stable. On post-operative day 3 the patient was felt ready for discharge.      Total Knee Joint Replacement Discharge Instructions:    I. ACTIVITIES:  1. Exercises:  ? Complete exercise program as taught by the hospital physical therapist 2 times per day  ? Exercise program will be advanced by the physical therapist  ? During the day be up ambulating every 2 hours (while awake) for short distances  ? Complete the ankle pump exercises at least 10 times per hour (while awake)  ? Elevate legs most of the day the first week post operatively and thereafter elevate legs when in bed and for at least 30 minutes during the day. Caution must be taken to avoid pillow placement under the bend of the knee as this can led to flexion contractures of the knee.  ? Use cold packs 20-30 minutes approximately 5 times per day. This should be done before and after completing your exercises and at any time you are experiencing pain/ stiffness in your operative extremity.      2. Activities of Daily Living:  ? No tub baths, hot tubs, or swimming pools for 4 weeks  ? May  shower and let water run over the incision on post-operative day #5 if no drainage. Do not scrub or rub the incision. Simply let the water run over the incision and pat dry.    II. Restrictions  ? Do not cross legs or kneel  ? Your surgeon will discuss with you when you will be able to drive again.  ? Weight bearing is as tolerated  ? First week stay inside on even terrain. May go up and down stairs one stair at a time utilizing the hand rail  ? After one week, you may venture outside.    III. Precautions:  ? Everyone that comes near you should wash their hands  ? No elective dental, genital-urinary, or colon procedures or surgical procedures for 12 weeks after surgery unless absolutely necessary.  ?  If dental work or surgical procedure is deemed absolutely necessary, you will need to contact your surgeon as you will need to take antibiotics 1 hour prior to any dental work (including teeth cleanings).  ? Please discuss with your surgeon prophylactic antibiotics as the length of time this intervention will be necessary for you varies with each patient’s health history and situation.  ? Avoid sick people. If you must be around someone who is ill, they should wear a mask.  ? Avoid visits to the Emergency Room or Urgent Care unless you are having a life threatening event.   ? If ordered stockings are to be placed on in the morning and removed at night. Monitor the stockings to ensure that any swelling is not causing the stockings to become too tight. In this case, remove stockings immediately.    IV. INCISION CARE:  ? Wash your hands prior to dressing changes  ? Change the dressing as needed to keep incision clean and dry. Utilize dry gauze and paper tape. Avoid touching the side of the gauze that goes against the incision with your hands.  ? No creams or ointments to the incision  ? May remove dressing once the incision is free of drainage  ? Do not touch or pick at the incision  ? Check incision every day and notify  surgeon immediately if any of the following signs or symptoms are noted:  o Increase in redness  o Increase in swelling around the incision and of the entire extremity  o Increase in pain  o Drainage oozing from the incision  o Pulling apart of the edges of the incision  o Increase in overall body temperature (greater than 100.5 degrees)  ? Your surgeon will instruct you regarding suture or staple removal    V. Medications:   1. Anticoagulants: You will be discharged on an anticoagulant. This is a prophylactic medication that helps prevent blood clots during your post-operative period. The type and length of dosage varies based on your individual needs, procedure performed, and surgeon’s preference.  ? While taking the anticoagulant, you should avoid taking any additional aspirin, ibuprofen (Advil or Motrin), Aleve (Naprosyn) or other non-steroidal anti-inflammatory medications.   ? Notify surgeon immediately if any shyam bleeding is noted in the urine, stool, emesis, or from the nose or the incision. Blood in the stool will often appear as black rather than red. Blood in urine may appear as pink. Blood in emesis may appear as brown/black like coffee grounds.  ? You will need to apply pressure for longer periods of time to any cuts or abrasions to stop bleeding  ? Avoid alcohol while taking anticoagulants    2. Stool Softeners: You will be at greater risk of constipation after surgery due to being less mobile and the pain medications.   ? Take stool softeners as instructed by your surgeon while on pain medications. Over the counter Colace 100 mg 1-2 capsules twice daily.   ? If stools become too loose or too frequent, please decreases the dosage or stop the stool softener.  ? If constipation occurs despite use of stool softeners, you are to continue the stool softeners and add a laxative (Milk of Magnesia 1 ounce daily as needed)  ? Drink plenty of fluids, and eat fruits and vegetables during your recovery  time    3. Pain Medications utilized after surgery are narcotics and the law requires that the following information be given to all patients that are prescribed narcotics:  ? CLASSIFICATION: Pain medications are called Opioids and are narcotics  ? LEGALITIES: It is illegal to share narcotics with others and to drive within 24 hours of taking narcotics  ? POTENTIAL SIDE EFFECTS: Potential side effects of opioids include: nausea, vomiting, itching, dizziness, drowsiness, dry mouth, constipation, and difficulty urinating.  ? POTENTIAL ADVERSE EFFECTS:   o Opioid tolerance can develop with use of pain medications and this simply means that it requires more and more of the medication to control pain; however, this is seen more in patients that use opioids for longer periods of time.  o Opioid dependence can develop with use of Opioids and this simply means that to stop the medication can cause withdrawal symptoms; however, this is seen with patients that use Opioids for longer periods of time.  o Opioid addiction can develop with use of Opioids and the incidence of this is very unlikely in patients who take the medications as ordered and stop the medications as instructed.  o Opioid overdose can be dangerous, but is unlikely when the medication is taken as ordered and stopped when ordered. It is important not to mix opioids with alcohol or with and type of sedative such as Benadryl as this can lead to over sedation and respiratory difficulty.  ? DOSAGE:   o Pain medications will need to be taken consistently for the first week to decrease pain and promote adequate pain relief and participation in physical therapy.  o After the initial surgical pain begins to resolve, you may begin to decrease the pain medication. By the end of 6-8 weeks, you should be off of pain medications.  o Refills will not be given by the office during evening hours, on weekends, or after 6-8 weeks post-op.  o To seek refills on pain medications  during the initial 6 week post-operative period, you must call the office 48 hours in advance to request the refill. The office will then notify you when to  the prescription. DO NOT wait until you are out of the medication to request a refill.    V. FOLLOW-UP VISITS:  ? You will need to follow up in the office with your surgeon in 3 weeks. Please call this number [phone] to schedule this appointment.  If you have any concerns or suspected complications prior to your follow up visit, please call your surgeons office. Do not wait until your appointment time if you suspect complications. These will need to be addressed in the office promptly.      Discharge Medications:     1) Percocet 5/325 mg  1-2 po q 4-6 hours for pain control  2)  Aspirin 325 mg po twice daily for 2 weeks, then once daily for 4 weeks.    CC:John Adams MD:Boris Espinoza MD

## 2017-03-18 NOTE — PROGRESS NOTES
"Ortho POD 2    Patient Name:  Elisa Kunz  YOB: 1935  Medical Records Number:  [id number]    No complaints    Visit Vitals   • /80 (BP Location: Left arm, Patient Position: Lying)   • Pulse 95   • Temp 97.9 °F (36.6 °C) (Oral)   • Resp 18   • Ht 60\" (152.4 cm)   • Wt 129 lb (58.5 kg)   • LMP  (LMP Unknown)   • SpO2 92%   • BMI 25.19 kg/m2       RLE:  NVI, calf nontender, sensation intact  No signs of DVT    Incision: clean, no infection    Lab Results (last 24 hours)     Procedure Component Value Units Date/Time    Hemoglobin & Hematocrit, Blood [[id number]]  (Abnormal) Collected:  03/18/17 0342    Specimen:  Blood Updated:  03/18/17 0410     Hemoglobin 10.3 (L) g/dL      Hematocrit 29.6 (L) %           S/p Right TKA  WBAT with walker  ASA for DVT prophylaxis  To rehab tomorrow  "

## 2017-03-18 NOTE — PROGRESS NOTES
Acute Care - Physical Therapy Treatment Note  [facility]     Patient Name: Elisa Kunz  : 1935  MRN: [id number]  Today's Date: 3/18/2017  Onset of Illness/Injury or Date of Surgery Date: 17     Referring Physician: Dr. Boris Espinoza    Admit Date: 3/16/2017    Visit Dx:    ICD-10-CM ICD-9-CM   1. Difficulty walking R26.2 719.7     Patient Active Problem List   Diagnosis   • OA (osteoarthritis) of knee               Adult Rehabilitation Note       17 1300 17 0830 17 1614    Rehab Assessment/Intervention    Discipline physical therapist  -INDRA physical therapist  -INDRA physical therapy assistant  -    Document Type therapy note (daily note)  -INDRA therapy note (daily note)  -INDRA therapy note (daily note)  -    Subjective Information agree to therapy  -INDRA agree to therapy  -INDRA agree to therapy;complains of;pain  -JM    Patient Effort, Rehab Treatment good  -INDRA good  -INDRA     Precautions/Limitations   fall precautions  -JM    Recorded by [INDRA] Nicci Jovel, PT [INDRA] Nicci Jovel, PT [JM] Sujata Trent, PTA    Pain Assessment    Pain Assessment 0-10  -INDRA 0-10  -INDRA 0-10  -JM    Pain Score 3  -INDRA 3  -INDRA 5  -JM    Pain Type   Surgical pain  -JM    Pain Location Knee  -INDRA Knee  -INDRA Knee  -JM    Pain Orientation Right  -INDRA Right  -INDRA Right  -JM    Pain Intervention(s)   Medication (See MAR)  -JM    Response to Interventions   royal  -JM    Recorded by [INDRA] Nicci Jovel, PT [INDRA] Nicci Jovel, PT [JM] Sujata Trent, PTA    Cognitive Assessment/Intervention    Current Cognitive/Communication Assessment functional  -INDRA functional  -INDRA     Orientation Status oriented x 4  -INDRA oriented x 4  -INDRA     Personal Safety WNL/WFL  -INDRA WNL/WFL  -INDRA     Personal Safety Interventions fall prevention program maintained;gait belt;muscle strengthening facilitated;nonskid shoes/slippers when out of bed  -INDRA fall prevention program maintained;gait belt;muscle strengthening  facilitated;nonskid shoes/slippers when out of bed  -INDRA     Recorded by [INDRA] Nicci Jovel, PT [INDRA] Nicci Jovel PT     ROM (Range of Motion)    General ROM Detail  20-90  -INDRA     Recorded by  [INDRA] Nicci Jovel PT     Bed Mobility, Assessment/Treatment    Bed Mob, Supine to Sit, [place] contact guard assist;supervision required  -INDRA contact guard assist;supervision required  -INDRA not tested  -    Bed Mob, Sit to Supine, [place] contact guard assist;supervision required  -INDRA contact guard assist;supervision required  -INDRA     Recorded by [INDRA] Nicci Jovel PT [INDRA] Nicci Jovel, PT [JM] Sujata Trent PTA    Transfer Assessment/Treatment    Transfers, Sit-Stand [place] contact guard assist;verbal cues required;stand by assist  -INDRA contact guard assist;verbal cues required  -INDRA minimum assist (75% patient effort);contact guard assist;verbal cues required  -    Transfers, Stand-Sit [place] contact guard assist;verbal cues required;stand by assist  -INDRA contact guard assist;verbal cues required  -INDRA contact guard assist;verbal cues required  -    Transfers, Sit-Stand-Sit, Assist Device rolling walker  -INDRA rolling walker  -INDRA rolling walker  -JM    Recorded by [INDRA] Nicci Jovel, PT [INDRA] Nicci Jovel, PT [JM] Sujata Trent PTA    Gait Assessment/Treatment    Gait, [place] Level contact guard assist;verbal cues required;stand by assist  -INDRA contact guard assist;verbal cues required  -INDRA contact guard assist;verbal cues required;nonverbal cues required (demo/gesture)  -    Gait, Assistive Device rolling walker  -INDRA rolling walker  -INDRA rolling walker  -    Gait, Distance (Feet) 175  -INDRA 100  -INDRA 40  -    Gait, Gait Deviations antalgic;lianet decreased;forward flexed posture  -INDRA antalgic;lianet decreased;forward flexed posture  -INDRA lianet decreased;step length decreased  -    Recorded by [INDRA] Nicci Jovel, PT [INDRA] Nicci Jovel, PT [JM] Sujata  JAYDA Trent    Therapy Exercises    Exercise Protocols total knee  -INDRA total knee  -INDRA total knee  -JM    Total Knee Exercises right:;completed protocol;30 reps  -INDRA right:;completed protocol;25 reps  -INDRA right:;completed protocol;20 reps   husb assisted in counting and to keep on task  -JM    Recorded by [INDRA] Nicci Jovel, PT [INDRA] Nicci Jovel, PT [JM] Sujata Trent PTA    Positioning and Restraints    Pre-Treatment Position sitting in chair/recliner  -INDRA sitting in chair/recliner  -INDRA sitting in chair/recliner  -JM    Post Treatment Position chair  -INDRA chair  -INDRA     In Chair reclined;call light within reach;with family/caregiver  -INDRA reclined;call light within reach;with family/caregiver  -INDRA reclined;call light within reach;encouraged to call for assist;with family/caregiver;notified nsg  -JM    Recorded by [INDRA] Nicci Jovel PT [INDRA] Nicci Jovel PT [JM] Sujata Trent PTA      03/17/17 0900          Rehab Assessment/Intervention    Discipline physical therapy assistant  -      Document Type therapy note (daily note)  -      Subjective Information agree to therapy;complains of;fatigue;pain  -      Precautions/Limitations fall precautions  -      Recorded by [RADHA] Sujata Trent PTA      Pain Assessment    Pain Assessment 0-10  -RADHA      Pain Score 5  -JM      Pain Type Surgical pain  -      Pain Location Knee  -      Pain Orientation Right  -      Pain Intervention(s) Medication (See MAR);Repositioned  -      Response to Interventions royal  -JM      Recorded by [RADHA] Sujata Trent PTA      ROM (Range of Motion)    General ROM Detail -7-97  -JM      Recorded by [RADHA] Sujata Trent PTA      Bed Mobility, Assessment/Treatment    Bed Mob, Supine to Sit, [place] not tested  -JM      Recorded by [RADHA] Sujata Trent PTA      Transfer Assessment/Treatment    Transfers, Sit-Stand [place] minimum assist (75% patient effort);contact guard assist;verbal cues required  -RADHA       Transfers, Stand-Sit [place] contact guard assist;verbal cues required  -      Transfers, Sit-Stand-Sit, Assist Device rolling walker  -      Recorded by [RADHA] Sujata Trent PTA      Gait Assessment/Treatment    Gait, [place] Level contact guard assist;verbal cues required;nonverbal cues required (demo/gesture)  -      Gait, Assistive Device rolling walker  -      Gait, Distance (Feet) 30  -JM      Gait, Gait Deviations step length decreased  -JM      Recorded by [RADHA] Sujata Trent PTA      Therapy Exercises    Exercise Protocols total knee  -      Total Knee Exercises right:;15 reps;completed protocol   son assisted in counting and to keep on task  -      Recorded by [RADHA] Sujata Trent PTA      Positioning and Restraints    Pre-Treatment Position sitting in chair/recliner  -JM      In Chair reclined;call light within reach;encouraged to call for assist;with family/caregiver  -      Recorded by [RADHA] Sujata Trent PTA        User Key  (r) = Recorded By, (t) = Taken By, (c) = Cosigned By    Initials Name Effective Dates    INDRA Jovel, PT 10/06/15 -     RADHA Trent, PTA 02/18/16 -                 IP PT Goals       03/16/17 1519          Bed Mobility PT LTG    Bed Mobility PT LTG, Date Established 03/16/17  -ALEJANDRA (ligia) DR ALEJANDRA keane (t))      Bed Mobility PT LTG, Time to Achieve 3 days  -ALEJANDRA (ligia) DR ALEJANDRA keane (t))      Bed Mobility PT LTG, [place] Level independent  -ALEJANDRA (kirt keane (t))      Transfer Training PT LTG    Transfer Training PT LTG, Date Established 03/16/17  -ALEJANDRA (ligia) DR ALEJANDRA keane (t))      Transfer Training PT LTG, Time to Achieve 3 days  -ALEJANDRA (ligia) DR ALEJANDRA keane (t))      Transfer Training PT LTG, Activity Type all transfers  -ALEJANDRA (kirt keane (t))      Transfer Training PT LTG, [place] Level conditional independence  -ALEJANDRA keane (r t))      Transfer Training PT LTG, Assist Device walker, rolling  -ALEJANDRA (kirt keane (t))      Gait Training PT LTG    Gait Training Goal  PT LTG, Date Established 03/16/17  -ALEJANDRA (ligia) DR ALEJANDRA keane (t))      Gait Training Goal PT LTG, Time to Achieve 3 days  -ALEJANDRA (ligia) DR ALEJANDRA keane (t))      Gait Training Goal PT LTG, [place] Level conditional independence  -ALEJANDRA (ligia) DR ALEJANDRA keane (t))      Gait Training Goal PT LTG, Assist Device walker, rolling  -ALEJANDRA (ligia) DR ALEJANDRA keane (t))      Gait Training Goal PT LTG, Distance to Achieve 50 ft  -ALEJANDRA keane (r t))      Range of Motion PT LTG    Range of Motion Goal PT LTG, Date Established 03/16/17  -ALEJANDRA (ligia) DR ALEJANDRA keane (t))      Range of Motion Goal PT LTG, Time to Achieve 3 days  -ALEJANDRA (ligia) DR ALEJANDRA keane (t))      Range fo Motion Goal PT LTG, Joint R knee  -ALEJANDRA (ligia) DR ALEJANDRA keane (t))      Range of Motion Goal PT LTG, AROM Measure 5 to 90 degrees  -ALEJANDRA marino) DR ALEJANDRA keane (t))      Patient Education PT LTG    Patient Education PT LTG, Date Established 03/16/17  -ALEJANDRA keane (r t))      Patient Education PT LTG, Education Type HEP  -ALEJANDRA (ligia) DR ALEJANDRA keane (t))      Patient Education PT LTG, Education Understanding verbalize understanding  -ALEJANDRA keane (r t))        User Key  (r) = Recorded By, (t) = Taken By, (c) = Cosigned By    Initials Name Provider Type    ALEJANDRA Cisneros, PT Physical Therapist    DR Yoshi Fletcher, PT Student PT Student          Physical Therapy Education     Title: PT OT SLP Therapies (Active)     Topic: Physical Therapy (Active)     Point: Mobility training (Active)    Learning Progress Summary    Learner Readiness Method Response Comment Documented by Status   Patient Acceptance E SOUTHNR  INDRA 03/18/17 1359 Done    Acceptance E,TB,D NR cooperative, just forgetful on meds  03/17/17 1612 Active    Acceptance E STANLEY DIA DR 03/16/17 1518 Done   Family Acceptance E,TB,D NR cooperative, just forgetful on meds  03/17/17 1612 Active               Point: Home exercise program (Active)    Learning Progress Summary    Learner Readiness Method Response Comment Documented by Status   Patient Acceptance E STANLEY DIA 03/18/17 6872 Done     Acceptance E,TB,D NR cooperative, just forgetful on meds  03/17/17 1612 Active    Acceptance E VU,NR  DR 03/16/17 1518 Done   Family Acceptance E,TB,D NR cooperative, just forgetful on meds  03/17/17 1612 Active               Point: Body mechanics (Active)    Learning Progress Summary    Learner Readiness Method Response Comment Documented by Status   Patient Acceptance E,TB,D NR cooperative, just forgetful on meds  03/17/17 1612 Active    Acceptance E VU,NR   03/16/17 1518 Done   Family Acceptance E,TB,D NR cooperative, just forgetful on meds  03/17/17 1612 Active               Point: Precautions (Active)    Learning Progress Summary    Learner Readiness Method Response Comment Documented by Status   Patient Acceptance E,TB,D NR cooperative, just forgetful on meds  03/17/17 1612 Active    Acceptance E VU,NR   03/16/17 1518 Done   Family Acceptance E,TB,D NR cooperative, just forgetful on meds  03/17/17 1612 Active                      User Key     Initials Effective Dates Name Provider Type Discipline     10/06/15 -  Nicci Jovel, PT Physical Therapist PT     02/18/16 -  Sujata Trent, PTA Physical Therapy Assistant PT     03/07/17 -  Yoshi Fletcher, PT Student PT Student PT                    PT Recommendation and Plan  Anticipated Discharge Disposition: skilled nursing facility  Planned Therapy Interventions: balance training, bed mobility training, gait training, home exercise program, stair training, strengthening  PT Frequency: 2 times/day  Plan of Care Review  Plan Of Care Reviewed With: patient  Progress: progress toward functional goals as expected          Outcome Measures       03/18/17 1400 03/17/17 1613 03/16/17 1500    How much help from another person do you currently need...    Turning from your back to your side while in flat bed without using bedrails? 3  -INDRA 3  - 3  -ALEJANDRA (r)  (t) ALEJANDRA (c)    Moving from lying on back to sitting on the side of a flat bed without bedrails? 3   -INDRA 3  -JM 2  -KH (r) DR ALEJANDRA (t) (randi)    Moving to and from a bed to a chair (including a wheelchair)? 3  -INDRA 3  -JM 2  -ALEJANDRA (ligia) DR ALEJANDRA keane (t))    Standing up from a chair using your arms (e.g., wheelchair, bedside chair)? 3  -INDRA 3  -JM 3  -ALEJANDRA (ligia) DR ALEJANDRA keane (t))    Climbing 3-5 steps with a railing? 2  -INDRA 2  -JM 1  -ALEJANDRA (ligia) DR ALEJANDRA keane (t))    To walk in hospital room? 3  -INDRA 3  -JM 2  -ALEJANDRA (r) DR smith) ALEJANDRA keane)    AM-PAC 6 Clicks Score 17  -INDRA 17  -JM 13  -ALEJANDRA (ligia) DR smith)    Functional Assessment    Outcome Measure Options AM-PAC 6 Clicks Basic Mobility (PT)  -INDRA  AM-PAC 6 Clicks Basic Mobility (PT)  -ALEJANDRA marino) DR ALEJANDRA (t) (randi)      User Key  (r) = Recorded By, (t) = Taken By, (c) = Cosigned By    Initials Name Provider Type    ALEJANDRA Cisneros, PT Physical Therapist    INDRA Jovel, PT Physical Therapist    RADAH Trent, PTA Physical Therapy Assistant    DR Yoshi Fletcher, PT Student PT Student           Time Calculation:         PT Charges       03/18/17 1542 03/18/17 1401       Time Calculation    Start Time 1300  -INDRA 0830  -INDRA     Stop Time 1400  -INDRA 0930  -INDRA     Time Calculation (min) 60 min  -INDRA 60 min  -INDRA     PT Received On 03/18/17  -INDRA 03/18/17  -INDRA       User Key  (r) = Recorded By, (t) = Taken By, (c) = Cosigned By    Initials Name Provider Type    INDRA Jovel, PT Physical Therapist          Therapy Charges for Today     Code Description Service Date Service Provider Modifiers Qty    41667642490 HC PT THER PROC GROUP 3/18/2017 Nicci Jovel, PT GP 1    40281477332 HC PT THER PROC EA 15 MIN 3/18/2017 Nicci Jovel, PT GP 1    01709563386 HC PT THER PROC GROUP 3/18/2017 Nicci Jovel, PT GP 1    17383410400 HC PT THER PROC EA 15 MIN 3/18/2017 Nicci Jovel, PT GP 1          PT G-Codes  Outcome Measure Options: AM-PAC 6 Clicks Basic Mobility (PT)    Nicci Jovel, PT  3/18/2017

## 2017-03-18 NOTE — PLAN OF CARE
Problem: Patient Care Overview (Adult)  Goal: Plan of Care Review  Outcome: Ongoing (interventions implemented as appropriate)    03/18/17 1400   Coping/Psychosocial Response Interventions   Plan Of Care Reviewed With patient   Patient Care Overview   Progress progress toward functional goals as expected

## 2017-03-19 VITALS
SYSTOLIC BLOOD PRESSURE: 104 MMHG | HEART RATE: 103 BPM | TEMPERATURE: 98.2 F | BODY MASS INDEX: 25.32 KG/M2 | HEIGHT: 60 IN | RESPIRATION RATE: 16 BRPM | WEIGHT: 129 LBS | OXYGEN SATURATION: 93 % | DIASTOLIC BLOOD PRESSURE: 67 MMHG

## 2017-03-19 LAB
HCT VFR BLD AUTO: 29.2 % (ref 35.6–45.5)
HGB BLD-MCNC: 10.1 G/DL (ref 11.9–15.5)

## 2017-03-19 PROCEDURE — 97110 THERAPEUTIC EXERCISES: CPT

## 2017-03-19 PROCEDURE — 94799 UNLISTED PULMONARY SVC/PX: CPT

## 2017-03-19 PROCEDURE — 85018 HEMOGLOBIN: CPT | Performed by: ORTHOPAEDIC SURGERY

## 2017-03-19 PROCEDURE — 85014 HEMATOCRIT: CPT | Performed by: ORTHOPAEDIC SURGERY

## 2017-03-19 PROCEDURE — 97150 GROUP THERAPEUTIC PROCEDURES: CPT

## 2017-03-19 RX ADMIN — FENOFIBRATE 145 MG: 145 TABLET ORAL at 09:48

## 2017-03-19 RX ADMIN — BUDESONIDE AND FORMOTEROL FUMARATE DIHYDRATE 2 PUFF: 80; 4.5 AEROSOL RESPIRATORY (INHALATION) at 08:04

## 2017-03-19 RX ADMIN — ASPIRIN 325 MG: 325 TABLET, DELAYED RELEASE ORAL at 09:48

## 2017-03-19 RX ADMIN — PRAVASTATIN SODIUM 40 MG: 40 TABLET ORAL at 09:48

## 2017-03-19 RX ADMIN — CELECOXIB 200 MG: 200 CAPSULE ORAL at 09:48

## 2017-03-19 RX ADMIN — PANTOPRAZOLE SODIUM 40 MG: 40 TABLET, DELAYED RELEASE ORAL at 06:00

## 2017-03-19 RX ADMIN — FERROUS SULFATE TAB 325 MG (65 MG ELEMENTAL FE) 325 MG: 325 (65 FE) TAB at 09:48

## 2017-03-19 RX ADMIN — ACETAMINOPHEN 400 MCG: 400 TABLET ORAL at 09:48

## 2017-03-19 RX ADMIN — DOCUSATE SODIUM,SENNOSIDES 2 TABLET: 50; 8.6 TABLET, FILM COATED ORAL at 09:48

## 2017-03-19 RX ADMIN — FUROSEMIDE 40 MG: 40 TABLET ORAL at 09:48

## 2017-03-19 RX ADMIN — OXYCODONE HYDROCHLORIDE AND ACETAMINOPHEN 2 TABLET: 5; 325 TABLET ORAL at 09:48

## 2017-03-19 RX ADMIN — SPIRONOLACTONE 25 MG: 25 TABLET, FILM COATED ORAL at 09:48

## 2017-03-19 NOTE — PLAN OF CARE
Problem: Patient Care Overview (Adult)  Goal: Plan of Care Review  Outcome: Ongoing (interventions implemented as appropriate)    03/19/17 1025   Coping/Psychosocial Response Interventions   Plan Of Care Reviewed With patient   Patient Care Overview   Progress progress toward functional goals as expected         Problem: Inpatient Physical Therapy  Goal: Bed Mobility Goal LTG- PT  Outcome: Unable to achieve outcome(s) by discharge Date Met:  03/19/17 03/19/17 1025   Bed Mobility PT LTG   Bed Mobility PT LTG, Outcome goal partially met   Bed Mobility PT LTG, Reason Goal Not Met discharged from facility       Goal: Transfer Training Goal 1 LTG- PT  Outcome: Unable to achieve outcome(s) by discharge Date Met:  03/19/17 03/19/17 1025   Transfer Training PT LTG   Transfer Training PT LTG, Outcome goal partially met   Transfer Training PT LTG, Reason Goal Not Met discharged from facility       Goal: Gait Training Goal LTG- PT  Outcome: Unable to achieve outcome(s) by discharge Date Met:  03/19/17 03/19/17 1025   Gait Training PT LTG   Gait Training Goal PT LTG, Outcome goal partially met   Gait Training Goal PT LTG, Reason Goal Not Met discharged from facility       Goal: Range of Motion Goal LTG- PT  Outcome: Unable to achieve outcome(s) by discharge Date Met:  03/19/17 03/19/17 1025   Range of Motion PT LTG   Range of Motion Goal PT LTG, Outcome goal partially met   Reason Goal Not Met (ROM) PT LTG discharged from facility       Goal: Patient Education Goal LTG- PT  Outcome: Outcome(s) achieved Date Met:  03/19/17 03/19/17 1025   Patient Education PT LTG   Patient Education PT LTG Outcome goal met

## 2017-03-19 NOTE — PLAN OF CARE
Problem: Patient Care Overview (Adult)  Goal: Plan of Care Review  Outcome: Ongoing (interventions implemented as appropriate)    03/19/17 0402   Coping/Psychosocial Response Interventions   Plan Of Care Reviewed With patient   Patient Care Overview   Progress progress toward functional goals as expected   Outcome Evaluation   Outcome Summary/Follow up Plan Pt is a post op day 2 of a rt total knee. Pt has some baseline dementia that was noticed this shift. Pt had some attempts of unassisted transfers this shift. Pt quickly reoriented. Pt has ambulated to bathroom with an assist of 1. Pt has been taking her meds crushed in applesauce. Swelling to rt lower lip has decreased. Pt is possibly going to [place] in AM.       Goal: Adult Individualization and Mutuality  Outcome: Ongoing (interventions implemented as appropriate)  Goal: Discharge Needs Assessment  Outcome: Ongoing (interventions implemented as appropriate)    Problem: Perioperative Period (Adult)  Goal: Signs and Symptoms of Listed Potential Problems Will be Absent or Manageable (Perioperative Period)  Outcome: Ongoing (interventions implemented as appropriate)    Problem: Knee Replacement, Total (Adult)  Goal: Signs and Symptoms of Listed Potential Problems Will be Absent or Manageable (Knee Replacement, Total)  Outcome: Ongoing (interventions implemented as appropriate)    Problem: Fall Risk (Adult)  Goal: Absence of Falls  Outcome: Ongoing (interventions implemented as appropriate)

## 2017-03-19 NOTE — THERAPY DISCHARGE NOTE
Acute Care - Physical Therapy Treatment Note/Discharge  [facility]     Patient Name: Elisa Kunz  : 1935  MRN: [id number]  Today's Date: 3/19/2017  Onset of Illness/Injury or Date of Surgery Date: 17     Referring Physician: Dr. Boris Espinoza    Admit Date: 3/16/2017    Visit Dx:    ICD-10-CM ICD-9-CM   1. Difficulty walking R26.2 719.7     Patient Active Problem List   Diagnosis   • OA (osteoarthritis) of knee       Physical Therapy Education     Title: PT OT SLP Therapies (Resolved)     Topic: Physical Therapy (Resolved)     Point: Mobility training (Resolved)    Learning Progress Summary    Learner Readiness Method Response Comment Documented by Status   Patient Acceptance E VU,DU   17 1025 Done    Acceptance E VU,NR   17 1359 Done    Acceptance E,TB,D NR cooperative, just forgetful on meds  17 1612 Active    Acceptance E VU,NR   17 1518 Done   Family Acceptance E,TB,D NR cooperative, just forgetful on meds  17 161 Active               Point: Home exercise program (Resolved)    Learning Progress Summary    Learner Readiness Method Response Comment Documented by Status   Patient Acceptance E VU,DU   17 1025 Done    Acceptance E VU,NR   17 1359 Done    Acceptance E,TB,D NR cooperative, just forgetful on meds  17 1612 Active    Acceptance E VU,NR   17 1518 Done   Family Acceptance E,TB,D NR cooperative, just forgetful on meds  17 161 Active               Point: Body mechanics (Resolved)    Learning Progress Summary    Learner Readiness Method Response Comment Documented by Status   Patient Acceptance E,TB,D NR cooperative, just forgetful on meds  17 1612 Active    Acceptance E VU,NR   17 1518 Done   Family Acceptance E,TB,D NR cooperative, just forgetful on meds  17 161 Active               Point: Precautions (Resolved)    Learning Progress Summary    Learner Readiness Method  Response Comment Documented by Status   Patient Acceptance E,TB,D NR cooperative, just forgetful on meds  03/17/17 1612 Active    Acceptance E VU,NR   03/16/17 1518 Done   Family Acceptance E,TB,D NR cooperative, just forgetful on meds  03/17/17 1612 Active                      User Key     Initials Effective Dates Name Provider Type Discipline     10/06/15 -  Nicci Jovel, PT Physical Therapist PT    RADHA 02/18/16 -  Sujata Trent, PTA Physical Therapy Assistant PT     03/07/17 -  Yoshi Fletcher, PT Student PT Student PT                    IP PT Goals       03/19/17 1025 03/16/17 1519       Bed Mobility PT LTG    Bed Mobility PT LTG, Date Established  03/16/17  -ALEJANDRA (kirt keane (t))     Bed Mobility PT LTG, Time to Achieve  3 days  -ALEJANDRA keane (r t))     Bed Mobility PT LTG, [place] Level  independent  -ALEJANDRA keane (r t))     Bed Mobility PT LTG, Outcome goal partially met  -INDRA      Bed Mobility PT LTG, Reason Goal Not Met discharged from facility  -INDRA      Transfer Training PT LTG    Transfer Training PT LTG, Date Established  03/16/17  -ALEJANDRA (kirt keane (t))     Transfer Training PT LTG, Time to Achieve  3 days  -ALEJANDRA keane (r t))     Transfer Training PT LTG, Activity Type  all transfers  -ALEJANDRA keane (r t))     Transfer Training PT LTG, [place] Level  conditional independence  -ALEJANDRA keane (r t))     Transfer Training PT LTG, Assist Device  walker, rolling  -ALEJANDRA keane (r t))     Transfer Training PT LTG, Outcome goal partially met  -INDRA      Transfer Training PT LTG, Reason Goal Not Met discharged from facility  -INDRA      Gait Training PT LTG    Gait Training Goal PT LTG, Date Established  03/16/17  -ALEJANDRA keane (r t))     Gait Training Goal PT LTG, Time to Achieve  3 days  -ALEJANDRA keane (r t))     Gait Training Goal PT LTG, [place] Level  conditional independence  -ALEJANDRA keane (r t))     Gait Training Goal PT LTG, Assist Device  walker, rolling  -ALEJANDRA keane (r t))      Gait Training Goal PT LTG, Distance to Achieve  50 ft  -ALEJANDRA (r) DR ALEJANDRA (t) (randi)     Gait Training Goal PT LTG, Outcome goal partially met  -INDRA      Gait Training Goal PT LTG, Reason Goal Not Met discharged from facility  -INDRA      Range of Motion PT LTG    Range of Motion Goal PT LTG, Date Established  03/16/17  -ALEJANDRA (r) DR ALEJANDRA keane (t))     Range of Motion Goal PT LTG, Time to Achieve  3 days  -ALEJANDRA (ligia) DR ALEJANDRA keane (t))     Range fo Motion Goal PT LTG, Joint  R knee  -ALEJANDRA (r) DR ALEJANDRA (t) (randi)     Range of Motion Goal PT LTG, AROM Measure  5 to 90 degrees  -ALEJANDRA (r) DR ALEJANDRA keane (t))     Range of Motion Goal PT LTG, Outcome goal partially met  -INDRA      Reason Goal Not Met (ROM) PT LTG discharged from facility  -INDRA      Patient Education PT LTG    Patient Education PT LTG, Date Established  03/16/17  -ALEJANDRA (ligia) DR ALEJANDRA keane (t))     Patient Education PT LTG, Education Type  HEP  -ALEJANDRA (ligia) DR ALEJANDRA keane (t))     Patient Education PT LTG, Education Understanding  verbalize understanding  -ALEJANDRA (ligia) DR ALEJANDRA keane (t))     Patient Education PT LTG Outcome goal met  -INDRA        User Key  (r) = Recorded By, (t) = Taken By, (c) = Cosigned By    Initials Name Provider Type    ALEJANDRA Cisneros, PT Physical Therapist    INDRA Jovel, PT Physical Therapist    DR Yoshi Fletcher, PT Student PT Student              Adult Rehabilitation Note       03/19/17 0830 03/18/17 1300 03/18/17 0830    Rehab Assessment/Intervention    Discipline physical therapist  -INDRA physical therapist  -INDRA physical therapist  -INDRA    Document Type therapy note (daily note);discharge summary  -INDRA therapy note (daily note)  -INDRA therapy note (daily note)  -INDRA    Subjective Information agree to therapy  -INDRA agree to therapy  -INDRA agree to therapy  -INDRA    Patient Effort, Rehab Treatment good  -INDRA good  -INDRA good  -INDRA    Recorded by [INDRA] Nicci Jovel, PT [INDRA] Nicci Jovel, PT [INDRA] Nicci Jovel, PT    Pain Assessment    Pain Assessment 0-10  -INDRA 0-10  -INDRA 0-10  -INDRA    Pain Score 3   -INDRA 3  -INDRA 3  -INDRA    Pain Location Knee  -INDRA Knee  -INDRA Knee  -INDRA    Pain Orientation Right  -INDRA Right  -INDRA Right  -INDRA    Recorded by [INDRA] Nicci Jovel PT [INDRA] Nicci Jovel PT [INDRA] Nicci Jovel, PT    Cognitive Assessment/Intervention    Current Cognitive/Communication Assessment functional  -INDRA functional  -INDRA functional  -INDRA    Orientation Status oriented x 4  -INDRA oriented x 4  -INDRA oriented x 4  -INDRA    Personal Safety WNL/WFL  -INDRA WNL/WFL  -INDRA WNL/WFL  -INDRA    Personal Safety Interventions fall prevention program maintained;gait belt;muscle strengthening facilitated;nonskid shoes/slippers when out of bed  -INDRA fall prevention program maintained;gait belt;muscle strengthening facilitated;nonskid shoes/slippers when out of bed  -INDRA fall prevention program maintained;gait belt;muscle strengthening facilitated;nonskid shoes/slippers when out of bed  -INDRA    Recorded by [INDRA] Nicci Jovel PT [INDRA] Nicci Jovel, PT [INDRA] Nicci Jovel PT    ROM (Range of Motion)    General ROM Detail 12-80  -INDRA  20-90  -INDRA    Recorded by [INDRA] Nicci Jovel PT  [INDRA] Nicci Jovel, PT    Bed Mobility, Assessment/Treatment    Bed Mob, Supine to Sit, [place] supervision required  -INDRA contact guard assist;supervision required  -INDRA contact guard assist;supervision required  -INDRA    Bed Mob, Sit to Supine, [place] supervision required  -INDRA contact guard assist;supervision required  -INDRA contact guard assist;supervision required  -INDRA    Recorded by [INDRA] Nicci Jovel, PT [INDRA] Nicci Jovel, PT [INDRA] Nicci Jovel, PT    Transfer Assessment/Treatment    Transfers, Sit-Stand [place] verbal cues required;stand by assist  -INDRA contact guard assist;verbal cues required;stand by assist  -INDRA contact guard assist;verbal cues required  -INDRA    Transfers, Stand-Sit [place] verbal cues required;stand by assist  -INDRA contact guard assist;verbal cues required;stand by assist  -INDRA contact guard assist;verbal  cues required  -INDRA    Transfers, Sit-Stand-Sit, Assist Device rolling walker  -INDRA rolling walker  -INDRA rolling walker  -INDRA    Recorded by [INDRA] Nicci Jovel, PT [INDRA] Nicci Jovel, PT [INDRA] Nicci Jovel, PT    Gait Assessment/Treatment    Gait, [place] Level verbal cues required;stand by assist  -INDRA contact guard assist;verbal cues required;stand by assist  -INDRA contact guard assist;verbal cues required  -INDRA    Gait, Assistive Device rolling walker  -INDRA rolling walker  -INDRA rolling walker  -INDRA    Gait, Distance (Feet) 175  -INDRA 175  -INDRA 100  -INDRA    Gait, Gait Deviations antalgic;lianet decreased;forward flexed posture  -INDRA antalgic;lianet decreased;forward flexed posture  -INDRA antalgic;lianet decreased;forward flexed posture  -INDRA    Recorded by [INDRA] Nicci Jovel, PT [INDRA] Nicci Jovel, PT [INDRA] Nicci Jovel, PT    Therapy Exercises    Exercise Protocols total knee  -INDRA total knee  -INDRA total knee  -INDRA    Total Knee Exercises right:;completed protocol;30 reps  -INDRA right:;completed protocol;30 reps  -INDRA right:;completed protocol;25 reps  -INDRA    Recorded by [INDRA] Nicci Jovel, PT [INDRA] Nicci Jovel, PT [INDRA] Nicci Jovel, PT    Positioning and Restraints    Pre-Treatment Position sitting in chair/recliner  -INDRA sitting in chair/recliner  -INDRA sitting in chair/recliner  -INDRA    Post Treatment Position chair  -INDRA chair  -INDRA chair  -INDRA    In Chair reclined;call light within reach;with family/caregiver  -INDRA reclined;call light within reach;with family/caregiver  -INDRA reclined;call light within reach;with family/caregiver  -INDRA    Recorded by [INDRA] Nicci Jovel, PT [INDRA] Nicci Jovel, PT [INDRA] Nicci Jovel, PT      03/17/17 1614 03/17/17 0900       Rehab Assessment/Intervention    Discipline physical therapy assistant  -RADHA physical therapy assistant  -RADHA     Document Type therapy note (daily note)  -RADHA therapy note (daily note)  -RADHA     Subjective Information agree to therapy;complains  of;pain  -JM agree to therapy;complains of;fatigue;pain  -JM     Precautions/Limitations fall precautions  - fall precautions  -     Recorded by [JM] Sujata Trent PTA [JM] Sujata Trent PTA     Pain Assessment    Pain Assessment 0-10  -JM 0-10  -JM     Pain Score 5  -JM 5  -JM     Pain Type Surgical pain  -JM Surgical pain  -JM     Pain Location Knee  -JM Knee  -JM     Pain Orientation Right  -JM Right  -JM     Pain Intervention(s) Medication (See MAR)  - Medication (See MAR);Repositioned  -JM     Response to Interventions royla  -JM royal  -JM     Recorded by [RADHA] Sujata Trent PTA [JM] Sujata Trent PTA     ROM (Range of Motion)    General ROM Detail  -7-97  -     Recorded by  [RADHA] Sujata Trent PTA     Bed Mobility, Assessment/Treatment    Bed Mob, Supine to Sit, [place] not tested  - not tested  -     Recorded by [RADHA] Sujata Trent PTA [JM] Sujata Trent PTA     Transfer Assessment/Treatment    Transfers, Sit-Stand [place] minimum assist (75% patient effort);contact guard assist;verbal cues required  - minimum assist (75% patient effort);contact guard assist;verbal cues required  -     Transfers, Stand-Sit [place] contact guard assist;verbal cues required  - contact guard assist;verbal cues required  -     Transfers, Sit-Stand-Sit, Assist Device rolling walker  - rolling walker  -     Recorded by [JM] Sujata Trent PTA [JM] Sujata Trent PTA     Gait Assessment/Treatment    Gait, [place] Level contact guard assist;verbal cues required;nonverbal cues required (demo/gesture)  - contact guard assist;verbal cues required;nonverbal cues required (demo/gesture)  -     Gait, Assistive Device rolling walker  - rolling walker  -     Gait, Distance (Feet) 40  -JM 30  -JM     Gait, Gait Deviations [place] decreased;step length decreased  - step length decreased  -     Recorded by [RADHA] Sujata Trent PTA [JM] Sujata Trent PTA     Therapy  Exercises    Exercise Protocols total knee  -JM total knee  -JM     Total Knee Exercises right:;completed protocol;20 reps   husb assisted in counting and to keep on task  -JM right:;15 reps;completed protocol   son assisted in counting and to keep on task  -JM     Recorded by [RADHA] Sujata Trent PTA [JM] Sujata Trent PTA     Positioning and Restraints    Pre-Treatment Position sitting in chair/recliner  -JM sitting in chair/recliner  -JM     In Chair reclined;call light within reach;encouraged to call for assist;with family/caregiver;notified nsg  -JM reclined;call light within reach;encouraged to call for assist;with family/caregiver  -JM     Recorded by [RADHA] Sujata Trent PTA [RADHA] Sujata Trent PTA       User Key  (r) = Recorded By, (t) = Taken By, (c) = Cosigned By    Initials Name Effective Dates    INDRA LloydNicciluciano Jovel, PT 10/06/15 -     RADHA Trent PTA 02/18/16 -           PT Recommendation and Plan  Anticipated Discharge Disposition: skilled nursing facility  Planned Therapy Interventions: balance training, bed mobility training, gait training, home exercise program, stair training, strengthening  PT Frequency: 2 times/day  Plan of Care Review  Plan Of Care Reviewed With: patient  Progress: progress toward functional goals as expected          Outcome Measures       03/19/17 1000 03/18/17 1400 03/17/17 1613    How much help from another person do you currently need...    Turning from your back to your side while in flat bed without using bedrails? 3  -INDRA 3  -INDRA 3  -JM    Moving from lying on back to sitting on the side of a flat bed without bedrails? 3  -INDRA 3  -INDRA 3  -JM    Moving to and from a bed to a chair (including a wheelchair)? 3  -INDRA 3  -INDRA 3  -JM    Standing up from a chair using your arms (e.g., wheelchair, bedside chair)? 3  -INDRA 3  -INDRA 3  -JM    Climbing 3-5 steps with a railing? 3  -INDRA 2  -INDRA 2  -JM    To walk in hospital room? 3  -INDRA 3  -INDRA 3  -JM    AM-PAC 6 Clicks Score 18   -INDRA 17  -INDRA 17  -    Functional Assessment    Outcome Measure Options AM-PAC 6 Clicks Basic Mobility (PT)  -INDRA AM-PAC 6 Clicks Basic Mobility (PT)  -INDRA       03/16/17 1500          How much help from another person do you currently need...    Turning from your back to your side while in flat bed without using bedrails? 3  -ALEJANDRA (r) DR smith) ALEJANDRA (randi)      Moving from lying on back to sitting on the side of a flat bed without bedrails? 2  -ALEJANDRA (r) DR smith) ALEJANDRA (randi)      Moving to and from a bed to a chair (including a wheelchair)? 2  -ALEJANDRA (r) DR smith) ALEJANDRA (randi)      Standing up from a chair using your arms (e.g., wheelchair, bedside chair)? 3  -ALEJANDRA (ligia) DR smith) ALEJANDRA (randi)      Climbing 3-5 steps with a railing? 1  -ALEJANDRA (ligia) DR smith) ALEJANDRA (randi)      To walk in hospital room? 2  -ALEJANDRA (r)  (jerry) ALEJANDRA (randi)      AM-PAC 6 Clicks Score 13  -ALEJANDRA (r)  (t)      Functional Assessment    Outcome Measure Options AM-PAC 6 Clicks Basic Mobility (PT)  -ALEJANDRA (r)  (t) ALEJANDRA (c)        User Key  (r) = Recorded By, (t) = Taken By, (c) = Cosigned By    Initials Name Provider Type    ALEJANDRA Cisneros, PT Physical Therapist    INDRA Jovel, PT Physical Therapist    RADHA Trent, PTA Physical Therapy Assistant    DR Yoshi Fletcher, PT Student PT Student           Time Calculation:         PT Charges       03/19/17 1027          Time Calculation    Start Time 0830  -INDRA      Stop Time 0930  -INDRA      Time Calculation (min) 60 min  -INDRA      PT Received On 03/19/17  -INDRA        User Key  (r) = Recorded By, (t) = Taken By, (c) = Cosigned By    Initials Name Provider Type    INDRA Jovel, PT Physical Therapist          Therapy Charges for Today     Code Description Service Date Service Provider Modifiers Qty    40929764148 HC PT THER PROC GROUP 3/18/2017 Nicci Jovel, PT GP 1    59919292191 HC PT THER PROC EA 15 MIN 3/18/2017 Nicci Jovel, PT GP 1    29541315072 HC PT THER PROC GROUP 3/18/2017 Nicci Jovel, PT GP 1    [id number]  PT THER PROC EA  15 MIN 3/18/2017 Nicci Jovel, PT GP 1    55630894979 HC PT THER PROC GROUP 3/19/2017 Nicci Jovel, PT GP 1    84755769207 HC PT THER PROC EA 15 MIN 3/19/2017 Nicci Jovel, PT GP 1          PT G-Codes  Outcome Measure Options: AM-PAC 6 Clicks Basic Mobility (PT)    PT Discharge Summary  Reason for Discharge: Discharge from facility    Nicci Jovel, PT  3/19/2017

## 2018-10-25 PROCEDURE — 99285 EMERGENCY DEPT VISIT HI MDM: CPT

## 2018-10-26 ENCOUNTER — HOSPITAL ENCOUNTER (INPATIENT)
Facility: HOSPITAL | Age: 83
LOS: 5 days | Discharge: HOME HEALTH | End: 2018-10-31
Attending: EMERGENCY MEDICINE | Admitting: INTERNAL MEDICINE

## 2018-10-26 ENCOUNTER — APPOINTMENT (OUTPATIENT)
Dept: GENERAL RADIOLOGY | Facility: HOSPITAL | Age: 83
End: 2018-10-26

## 2018-10-26 ENCOUNTER — APPOINTMENT (OUTPATIENT)
Dept: CT IMAGING | Facility: HOSPITAL | Age: 83
End: 2018-10-26

## 2018-10-26 ENCOUNTER — APPOINTMENT (OUTPATIENT)
Dept: GENERAL RADIOLOGY | Facility: HOSPITAL | Age: 83
End: 2018-10-26
Attending: INTERNAL MEDICINE

## 2018-10-26 DIAGNOSIS — J44.9 CHRONIC OBSTRUCTIVE PULMONARY DISEASE, UNSPECIFIED COPD TYPE (HCC): ICD-10-CM

## 2018-10-26 DIAGNOSIS — R26.2 DIFFICULTY WALKING: ICD-10-CM

## 2018-10-26 DIAGNOSIS — R13.12 OROPHARYNGEAL DYSPHAGIA: ICD-10-CM

## 2018-10-26 DIAGNOSIS — M54.9 INTRACTABLE BACK PAIN: Primary | ICD-10-CM

## 2018-10-26 DIAGNOSIS — S30.0XXA LUMBAR CONTUSION, INITIAL ENCOUNTER: ICD-10-CM

## 2018-10-26 DIAGNOSIS — S39.012A STRAIN OF LUMBAR REGION, INITIAL ENCOUNTER: ICD-10-CM

## 2018-10-26 DIAGNOSIS — S22.000A CLOSED COMPRESSION FRACTURE OF THORACIC VERTEBRA, INITIAL ENCOUNTER (HCC): ICD-10-CM

## 2018-10-26 PROBLEM — E78.5 HLD (HYPERLIPIDEMIA): Status: ACTIVE | Noted: 2018-10-26

## 2018-10-26 PROBLEM — M81.0 OSTEOPOROSIS: Status: ACTIVE | Noted: 2018-10-26

## 2018-10-26 LAB
ALBUMIN SERPL-MCNC: 4.1 G/DL (ref 3.5–5.2)
ALBUMIN/GLOB SERPL: 1.5 G/DL
ALP SERPL-CCNC: 83 U/L (ref 39–117)
ALT SERPL W P-5'-P-CCNC: 39 U/L (ref 1–33)
ANION GAP SERPL CALCULATED.3IONS-SCNC: 12.5 MMOL/L
AST SERPL-CCNC: 36 U/L (ref 1–32)
BILIRUB SERPL-MCNC: 0.3 MG/DL (ref 0.1–1.2)
BUN BLD-MCNC: 22 MG/DL (ref 8–23)
BUN/CREAT SERPL: 23.7 (ref 7–25)
CALCIUM SPEC-SCNC: 9.4 MG/DL (ref 8.6–10.5)
CHLORIDE SERPL-SCNC: 96 MMOL/L (ref 98–107)
CO2 SERPL-SCNC: 25.5 MMOL/L (ref 22–29)
CREAT BLD-MCNC: 0.93 MG/DL (ref 0.57–1)
DEPRECATED RDW RBC AUTO: 46 FL (ref 37–54)
ERYTHROCYTE [DISTWIDTH] IN BLOOD BY AUTOMATED COUNT: 13.3 % (ref 11.7–13)
GFR SERPL CREATININE-BSD FRML MDRD: 58 ML/MIN/1.73
GLOBULIN UR ELPH-MCNC: 2.8 GM/DL
GLUCOSE BLD-MCNC: 172 MG/DL (ref 65–99)
HCT VFR BLD AUTO: 41.4 % (ref 35.6–45.5)
HGB BLD-MCNC: 13.1 G/DL (ref 11.9–15.5)
INR PPP: 1.03 (ref 0.9–1.1)
MCH RBC QN AUTO: 30.1 PG (ref 26.9–32)
MCHC RBC AUTO-ENTMCNC: 31.6 G/DL (ref 32.4–36.3)
MCV RBC AUTO: 95.2 FL (ref 80.5–98.2)
PLATELET # BLD AUTO: 245 10*3/MM3 (ref 140–500)
PMV BLD AUTO: 10.9 FL (ref 6–12)
POTASSIUM BLD-SCNC: 3.6 MMOL/L (ref 3.5–5.2)
PROT SERPL-MCNC: 6.9 G/DL (ref 6–8.5)
PROTHROMBIN TIME: 13.3 SECONDS (ref 11.7–14.2)
RBC # BLD AUTO: 4.35 10*6/MM3 (ref 3.9–5.2)
SODIUM BLD-SCNC: 134 MMOL/L (ref 136–145)
WBC NRBC COR # BLD: 12.98 10*3/MM3 (ref 4.5–10.7)

## 2018-10-26 PROCEDURE — 85027 COMPLETE CBC AUTOMATED: CPT | Performed by: NURSE PRACTITIONER

## 2018-10-26 PROCEDURE — 25010000002 ONDANSETRON PER 1 MG: Performed by: NURSE PRACTITIONER

## 2018-10-26 PROCEDURE — 36415 COLL VENOUS BLD VENIPUNCTURE: CPT | Performed by: NURSE PRACTITIONER

## 2018-10-26 PROCEDURE — 97535 SELF CARE MNGMENT TRAINING: CPT

## 2018-10-26 PROCEDURE — G0378 HOSPITAL OBSERVATION PER HR: HCPCS

## 2018-10-26 PROCEDURE — 72170 X-RAY EXAM OF PELVIS: CPT

## 2018-10-26 PROCEDURE — 25010000002 HYDROMORPHONE PER 4 MG: Performed by: EMERGENCY MEDICINE

## 2018-10-26 PROCEDURE — 94640 AIRWAY INHALATION TREATMENT: CPT

## 2018-10-26 PROCEDURE — 85610 PROTHROMBIN TIME: CPT | Performed by: NURSE PRACTITIONER

## 2018-10-26 PROCEDURE — 63710000001 PROMETHAZINE PER 25 MG: Performed by: NURSE PRACTITIONER

## 2018-10-26 PROCEDURE — 97161 PT EVAL LOW COMPLEX 20 MIN: CPT

## 2018-10-26 PROCEDURE — G8988 SELF CARE GOAL STATUS: HCPCS

## 2018-10-26 PROCEDURE — 97110 THERAPEUTIC EXERCISES: CPT

## 2018-10-26 PROCEDURE — G8987 SELF CARE CURRENT STATUS: HCPCS

## 2018-10-26 PROCEDURE — G8989 SELF CARE D/C STATUS: HCPCS

## 2018-10-26 PROCEDURE — 25010000002 ONDANSETRON PER 1 MG: Performed by: EMERGENCY MEDICINE

## 2018-10-26 PROCEDURE — 71046 X-RAY EXAM CHEST 2 VIEWS: CPT

## 2018-10-26 PROCEDURE — 80053 COMPREHEN METABOLIC PANEL: CPT | Performed by: NURSE PRACTITIONER

## 2018-10-26 PROCEDURE — 71250 CT THORAX DX C-: CPT

## 2018-10-26 PROCEDURE — 94799 UNLISTED PULMONARY SVC/PX: CPT

## 2018-10-26 PROCEDURE — 72110 X-RAY EXAM L-2 SPINE 4/>VWS: CPT

## 2018-10-26 PROCEDURE — 92610 EVALUATE SWALLOWING FUNCTION: CPT

## 2018-10-26 PROCEDURE — 97165 OT EVAL LOW COMPLEX 30 MIN: CPT

## 2018-10-26 RX ORDER — ONDANSETRON 4 MG/1
4 TABLET, ORALLY DISINTEGRATING ORAL EVERY 6 HOURS PRN
Status: DISCONTINUED | OUTPATIENT
Start: 2018-10-26 | End: 2018-10-31 | Stop reason: HOSPADM

## 2018-10-26 RX ORDER — HYDROCODONE BITARTRATE AND ACETAMINOPHEN 10; 325 MG/1; MG/1
1 TABLET ORAL EVERY 4 HOURS PRN
Status: DISCONTINUED | OUTPATIENT
Start: 2018-10-26 | End: 2018-10-31 | Stop reason: HOSPADM

## 2018-10-26 RX ORDER — MELATONIN
1000 DAILY
COMMUNITY

## 2018-10-26 RX ORDER — SODIUM CHLORIDE 0.9 % (FLUSH) 0.9 %
1-10 SYRINGE (ML) INJECTION AS NEEDED
Status: DISCONTINUED | OUTPATIENT
Start: 2018-10-26 | End: 2018-10-31 | Stop reason: HOSPADM

## 2018-10-26 RX ORDER — PROMETHAZINE HYDROCHLORIDE 25 MG/1
25 TABLET ORAL ONCE
Status: COMPLETED | OUTPATIENT
Start: 2018-10-26 | End: 2018-10-26

## 2018-10-26 RX ORDER — HYDROCODONE BITARTRATE AND ACETAMINOPHEN 10; 325 MG/1; MG/1
1 TABLET ORAL EVERY 4 HOURS PRN
Qty: 18 TABLET | Refills: 0 | Status: SHIPPED | OUTPATIENT
Start: 2018-10-26 | End: 2018-11-05

## 2018-10-26 RX ORDER — HYDROMORPHONE HYDROCHLORIDE 1 MG/ML
0.5 INJECTION, SOLUTION INTRAMUSCULAR; INTRAVENOUS; SUBCUTANEOUS ONCE
Status: COMPLETED | OUTPATIENT
Start: 2018-10-26 | End: 2018-10-26

## 2018-10-26 RX ORDER — ATORVASTATIN CALCIUM 10 MG/1
10 TABLET, FILM COATED ORAL DAILY
Status: DISCONTINUED | OUTPATIENT
Start: 2018-10-26 | End: 2018-10-31 | Stop reason: HOSPADM

## 2018-10-26 RX ORDER — MELATONIN
1000 DAILY
Status: DISCONTINUED | OUTPATIENT
Start: 2018-10-26 | End: 2018-10-31 | Stop reason: HOSPADM

## 2018-10-26 RX ORDER — ONDANSETRON 2 MG/ML
4 INJECTION INTRAMUSCULAR; INTRAVENOUS ONCE
Status: COMPLETED | OUTPATIENT
Start: 2018-10-26 | End: 2018-10-26

## 2018-10-26 RX ORDER — HYDROCODONE BITARTRATE AND ACETAMINOPHEN 7.5; 325 MG/1; MG/1
1 TABLET ORAL ONCE
Status: COMPLETED | OUTPATIENT
Start: 2018-10-26 | End: 2018-10-26

## 2018-10-26 RX ORDER — DOXYCYCLINE 50 MG/1
100 CAPSULE ORAL ONCE
COMMUNITY
End: 2018-10-31 | Stop reason: HOSPADM

## 2018-10-26 RX ORDER — ONDANSETRON 4 MG/1
4 TABLET, FILM COATED ORAL EVERY 6 HOURS PRN
Status: DISCONTINUED | OUTPATIENT
Start: 2018-10-26 | End: 2018-10-31 | Stop reason: HOSPADM

## 2018-10-26 RX ORDER — ONDANSETRON 2 MG/ML
4 INJECTION INTRAMUSCULAR; INTRAVENOUS EVERY 6 HOURS PRN
Status: DISCONTINUED | OUTPATIENT
Start: 2018-10-26 | End: 2018-10-31 | Stop reason: HOSPADM

## 2018-10-26 RX ORDER — CHOLECALCIFEROL (VITAMIN D3) 125 MCG
1000 CAPSULE ORAL DAILY
Status: DISCONTINUED | OUTPATIENT
Start: 2018-10-26 | End: 2018-10-31 | Stop reason: HOSPADM

## 2018-10-26 RX ORDER — MELOXICAM 15 MG/1
15 TABLET ORAL DAILY
COMMUNITY
End: 2018-10-26

## 2018-10-26 RX ORDER — HYDROCODONE BITARTRATE AND ACETAMINOPHEN 10; 325 MG/1; MG/1
1 TABLET ORAL EVERY 4 HOURS PRN
Qty: 18 TABLET | Refills: 0 | Status: SHIPPED | OUTPATIENT
Start: 2018-10-26 | End: 2018-10-26

## 2018-10-26 RX ORDER — SODIUM CHLORIDE 0.9 % (FLUSH) 0.9 %
3 SYRINGE (ML) INJECTION EVERY 12 HOURS SCHEDULED
Status: DISCONTINUED | OUTPATIENT
Start: 2018-10-26 | End: 2018-10-31 | Stop reason: HOSPADM

## 2018-10-26 RX ORDER — SODIUM PHOSPHATE,MONO-DIBASIC 19G-7G/118
ENEMA (ML) RECTAL 2 TIMES DAILY WITH MEALS
COMMUNITY

## 2018-10-26 RX ORDER — LANOLIN ALCOHOL/MO/W.PET/CERES
400 CREAM (GRAM) TOPICAL DAILY
Status: DISCONTINUED | OUTPATIENT
Start: 2018-10-26 | End: 2018-10-31 | Stop reason: HOSPADM

## 2018-10-26 RX ORDER — OXYCODONE HYDROCHLORIDE AND ACETAMINOPHEN 5; 325 MG/1; MG/1
1 TABLET ORAL EVERY 4 HOURS PRN
Qty: 20 TABLET | Refills: 0 | Status: SHIPPED | OUTPATIENT
Start: 2018-10-26 | End: 2018-10-26 | Stop reason: HOSPADM

## 2018-10-26 RX ORDER — ACETAMINOPHEN 325 MG/1
650 TABLET ORAL EVERY 4 HOURS PRN
Status: DISCONTINUED | OUTPATIENT
Start: 2018-10-26 | End: 2018-10-31 | Stop reason: HOSPADM

## 2018-10-26 RX ORDER — FUROSEMIDE 40 MG/1
40 TABLET ORAL
Status: DISCONTINUED | OUTPATIENT
Start: 2018-10-26 | End: 2018-10-31 | Stop reason: HOSPADM

## 2018-10-26 RX ORDER — CYCLOBENZAPRINE HCL 10 MG
10 TABLET ORAL 3 TIMES DAILY
Qty: 15 TABLET | Refills: 0 | Status: SHIPPED | OUTPATIENT
Start: 2018-10-26 | End: 2018-10-31 | Stop reason: HOSPADM

## 2018-10-26 RX ORDER — SPIRONOLACTONE 25 MG/1
25 TABLET ORAL DAILY
Status: DISCONTINUED | OUTPATIENT
Start: 2018-10-26 | End: 2018-10-31 | Stop reason: HOSPADM

## 2018-10-26 RX ORDER — NAPROXEN 500 MG/1
500 TABLET ORAL 2 TIMES DAILY WITH MEALS
Qty: 20 TABLET | Refills: 0 | Status: SHIPPED | OUTPATIENT
Start: 2018-10-26 | End: 2018-11-26

## 2018-10-26 RX ORDER — MONTELUKAST SODIUM 10 MG/1
10 TABLET ORAL NIGHTLY
Status: DISCONTINUED | OUTPATIENT
Start: 2018-10-26 | End: 2018-10-31 | Stop reason: HOSPADM

## 2018-10-26 RX ORDER — BUDESONIDE AND FORMOTEROL FUMARATE DIHYDRATE 160; 4.5 UG/1; UG/1
2 AEROSOL RESPIRATORY (INHALATION)
Status: DISCONTINUED | OUTPATIENT
Start: 2018-10-26 | End: 2018-10-31 | Stop reason: HOSPADM

## 2018-10-26 RX ORDER — HYDROCODONE BITARTRATE AND ACETAMINOPHEN 10; 325 MG/1; MG/1
1 TABLET ORAL EVERY 6 HOURS PRN
Status: DISCONTINUED | OUTPATIENT
Start: 2018-10-26 | End: 2018-10-26

## 2018-10-26 RX ORDER — PANTOPRAZOLE SODIUM 40 MG/1
40 TABLET, DELAYED RELEASE ORAL EVERY MORNING
Status: DISCONTINUED | OUTPATIENT
Start: 2018-10-26 | End: 2018-10-31 | Stop reason: HOSPADM

## 2018-10-26 RX ORDER — LANOLIN ALCOHOL/MO/W.PET/CERES
3000 CREAM (GRAM) TOPICAL DAILY
COMMUNITY

## 2018-10-26 RX ADMIN — ATORVASTATIN CALCIUM 10 MG: 10 TABLET, FILM COATED ORAL at 17:13

## 2018-10-26 RX ADMIN — VITAMIN D, TAB 1000IU (100/BT) 1000 UNITS: 25 TAB at 17:13

## 2018-10-26 RX ADMIN — MONTELUKAST SODIUM 10 MG: 10 TABLET, FILM COATED ORAL at 19:55

## 2018-10-26 RX ADMIN — ONDANSETRON 4 MG: 2 INJECTION INTRAMUSCULAR; INTRAVENOUS at 21:36

## 2018-10-26 RX ADMIN — Medication 3 ML: at 19:56

## 2018-10-26 RX ADMIN — ONDANSETRON 4 MG: 2 INJECTION INTRAMUSCULAR; INTRAVENOUS at 01:51

## 2018-10-26 RX ADMIN — SPIRONOLACTONE 25 MG: 25 TABLET ORAL at 17:14

## 2018-10-26 RX ADMIN — HYDROCODONE BITARTRATE AND ACETAMINOPHEN 1 TABLET: 7.5; 325 TABLET ORAL at 00:37

## 2018-10-26 RX ADMIN — HYDROMORPHONE HYDROCHLORIDE 0.5 MG: 1 INJECTION, SOLUTION INTRAMUSCULAR; INTRAVENOUS; SUBCUTANEOUS at 01:51

## 2018-10-26 RX ADMIN — BUDESONIDE AND FORMOTEROL FUMARATE DIHYDRATE 2 PUFF: 160; 4.5 AEROSOL RESPIRATORY (INHALATION) at 20:07

## 2018-10-26 RX ADMIN — PROMETHAZINE HYDROCHLORIDE 25 MG: 25 TABLET ORAL at 09:30

## 2018-10-26 RX ADMIN — ACETAMINOPHEN 400 MCG: 400 TABLET ORAL at 17:13

## 2018-10-26 RX ADMIN — Medication 3 ML: at 17:20

## 2018-10-26 RX ADMIN — Medication 1000 MCG: at 17:13

## 2018-10-26 RX ADMIN — ONDANSETRON 4 MG: 2 INJECTION INTRAMUSCULAR; INTRAVENOUS at 04:20

## 2018-10-26 RX ADMIN — HYDROCODONE BITARTRATE AND ACETAMINOPHEN 1 TABLET: 10; 325 TABLET ORAL at 09:28

## 2018-10-26 RX ADMIN — BUDESONIDE AND FORMOTEROL FUMARATE DIHYDRATE 2 PUFF: 160; 4.5 AEROSOL RESPIRATORY (INHALATION) at 16:52

## 2018-10-26 RX ADMIN — FUROSEMIDE 40 MG: 40 TABLET ORAL at 17:14

## 2018-10-26 RX ADMIN — PANTOPRAZOLE SODIUM 40 MG: 40 TABLET, DELAYED RELEASE ORAL at 17:14

## 2018-10-26 RX ADMIN — HYDROCODONE BITARTRATE AND ACETAMINOPHEN 1 TABLET: 7.5; 325 TABLET ORAL at 04:22

## 2018-10-27 PROBLEM — J15.9 BACTERIAL PNEUMONIA: Status: ACTIVE | Noted: 2018-10-27

## 2018-10-27 PROBLEM — M80.08XA PATHOLOGICAL FRACTURE OF VERTEBRA DUE TO OSTEOPOROSIS (HCC): Status: ACTIVE | Noted: 2018-10-27

## 2018-10-27 PROBLEM — J15.9 COMMUNITY ACQUIRED BACTERIAL PNEUMONIA: Status: ACTIVE | Noted: 2018-10-27

## 2018-10-27 PROBLEM — S22.000A CLOSED COMPRESSION FRACTURE OF THORACIC VERTEBRA (HCC): Status: ACTIVE | Noted: 2018-10-27

## 2018-10-27 LAB
L PNEUMO1 AG UR QL IA: NEGATIVE
S PNEUM AG SPEC QL LA: NEGATIVE

## 2018-10-27 PROCEDURE — 87899 AGENT NOS ASSAY W/OPTIC: CPT | Performed by: INTERNAL MEDICINE

## 2018-10-27 PROCEDURE — 94799 UNLISTED PULMONARY SVC/PX: CPT

## 2018-10-27 PROCEDURE — 99222 1ST HOSP IP/OBS MODERATE 55: CPT | Performed by: NEUROLOGICAL SURGERY

## 2018-10-27 PROCEDURE — 25010000002 AZITHROMYCIN PER 500 MG: Performed by: INTERNAL MEDICINE

## 2018-10-27 PROCEDURE — 94640 AIRWAY INHALATION TREATMENT: CPT

## 2018-10-27 PROCEDURE — 97116 GAIT TRAINING THERAPY: CPT

## 2018-10-27 PROCEDURE — 92526 ORAL FUNCTION THERAPY: CPT

## 2018-10-27 PROCEDURE — 25010000002 CEFTRIAXONE PER 250 MG: Performed by: INTERNAL MEDICINE

## 2018-10-27 RX ORDER — CEFTRIAXONE SODIUM 1 G/50ML
1 INJECTION, SOLUTION INTRAVENOUS EVERY 24 HOURS
Status: DISCONTINUED | OUTPATIENT
Start: 2018-10-27 | End: 2018-10-31 | Stop reason: HOSPADM

## 2018-10-27 RX ADMIN — Medication 3 ML: at 08:52

## 2018-10-27 RX ADMIN — ACETAMINOPHEN 400 MCG: 400 TABLET ORAL at 08:51

## 2018-10-27 RX ADMIN — MONTELUKAST SODIUM 10 MG: 10 TABLET, FILM COATED ORAL at 20:41

## 2018-10-27 RX ADMIN — SPIRONOLACTONE 25 MG: 25 TABLET ORAL at 08:51

## 2018-10-27 RX ADMIN — VITAMIN D, TAB 1000IU (100/BT) 1000 UNITS: 25 TAB at 08:51

## 2018-10-27 RX ADMIN — FUROSEMIDE 40 MG: 40 TABLET ORAL at 08:51

## 2018-10-27 RX ADMIN — ONDANSETRON 4 MG: 4 TABLET, FILM COATED ORAL at 18:21

## 2018-10-27 RX ADMIN — HYDROCODONE BITARTRATE AND ACETAMINOPHEN 1 TABLET: 10; 325 TABLET ORAL at 09:08

## 2018-10-27 RX ADMIN — Medication 1000 MCG: at 08:51

## 2018-10-27 RX ADMIN — BUDESONIDE AND FORMOTEROL FUMARATE DIHYDRATE 2 PUFF: 160; 4.5 AEROSOL RESPIRATORY (INHALATION) at 20:33

## 2018-10-27 RX ADMIN — CEFTRIAXONE SODIUM 1 G: 1 INJECTION, SOLUTION INTRAVENOUS at 12:23

## 2018-10-27 RX ADMIN — PANTOPRAZOLE SODIUM 40 MG: 40 TABLET, DELAYED RELEASE ORAL at 05:58

## 2018-10-27 RX ADMIN — ONDANSETRON 4 MG: 4 TABLET, FILM COATED ORAL at 09:08

## 2018-10-27 RX ADMIN — BUDESONIDE AND FORMOTEROL FUMARATE DIHYDRATE 2 PUFF: 160; 4.5 AEROSOL RESPIRATORY (INHALATION) at 07:30

## 2018-10-27 RX ADMIN — FUROSEMIDE 40 MG: 40 TABLET ORAL at 18:21

## 2018-10-27 RX ADMIN — Medication 3 ML: at 20:41

## 2018-10-27 RX ADMIN — ACETAMINOPHEN 650 MG: 325 TABLET, FILM COATED ORAL at 05:57

## 2018-10-27 RX ADMIN — ATORVASTATIN CALCIUM 10 MG: 10 TABLET, FILM COATED ORAL at 08:51

## 2018-10-27 RX ADMIN — AZITHROMYCIN MONOHYDRATE 500 MG: 500 INJECTION, POWDER, LYOPHILIZED, FOR SOLUTION INTRAVENOUS at 14:11

## 2018-10-27 RX ADMIN — HYDROCODONE BITARTRATE AND ACETAMINOPHEN 1 TABLET: 10; 325 TABLET ORAL at 18:21

## 2018-10-28 LAB
ANION GAP SERPL CALCULATED.3IONS-SCNC: 12.1 MMOL/L
BASOPHILS # BLD AUTO: 0.01 10*3/MM3 (ref 0–0.2)
BASOPHILS NFR BLD AUTO: 0.1 % (ref 0–1.5)
BUN BLD-MCNC: 17 MG/DL (ref 8–23)
BUN/CREAT SERPL: 16.8 (ref 7–25)
CALCIUM SPEC-SCNC: 9.4 MG/DL (ref 8.6–10.5)
CHLORIDE SERPL-SCNC: 93 MMOL/L (ref 98–107)
CO2 SERPL-SCNC: 25.9 MMOL/L (ref 22–29)
CREAT BLD-MCNC: 1.01 MG/DL (ref 0.57–1)
DEPRECATED RDW RBC AUTO: 46.5 FL (ref 37–54)
EOSINOPHIL # BLD AUTO: 0.08 10*3/MM3 (ref 0–0.7)
EOSINOPHIL NFR BLD AUTO: 0.9 % (ref 0.3–6.2)
ERYTHROCYTE [DISTWIDTH] IN BLOOD BY AUTOMATED COUNT: 13.3 % (ref 11.7–13)
GFR SERPL CREATININE-BSD FRML MDRD: 52 ML/MIN/1.73
GLUCOSE BLD-MCNC: 118 MG/DL (ref 65–99)
HCT VFR BLD AUTO: 39.6 % (ref 35.6–45.5)
HGB BLD-MCNC: 12.6 G/DL (ref 11.9–15.5)
IMM GRANULOCYTES # BLD: 0.02 10*3/MM3 (ref 0–0.03)
IMM GRANULOCYTES NFR BLD: 0.2 % (ref 0–0.5)
LYMPHOCYTES # BLD AUTO: 0.89 10*3/MM3 (ref 0.9–4.8)
LYMPHOCYTES NFR BLD AUTO: 9.8 % (ref 19.6–45.3)
MCH RBC QN AUTO: 30.3 PG (ref 26.9–32)
MCHC RBC AUTO-ENTMCNC: 31.8 G/DL (ref 32.4–36.3)
MCV RBC AUTO: 95.2 FL (ref 80.5–98.2)
MONOCYTES # BLD AUTO: 0.58 10*3/MM3 (ref 0.2–1.2)
MONOCYTES NFR BLD AUTO: 6.4 % (ref 5–12)
NEUTROPHILS # BLD AUTO: 7.49 10*3/MM3 (ref 1.9–8.1)
NEUTROPHILS NFR BLD AUTO: 82.6 % (ref 42.7–76)
PLATELET # BLD AUTO: 205 10*3/MM3 (ref 140–500)
PMV BLD AUTO: 10.2 FL (ref 6–12)
POTASSIUM BLD-SCNC: 3.8 MMOL/L (ref 3.5–5.2)
RBC # BLD AUTO: 4.16 10*6/MM3 (ref 3.9–5.2)
SODIUM BLD-SCNC: 131 MMOL/L (ref 136–145)
WBC NRBC COR # BLD: 9.07 10*3/MM3 (ref 4.5–10.7)

## 2018-10-28 PROCEDURE — 99231 SBSQ HOSP IP/OBS SF/LOW 25: CPT | Performed by: NEUROLOGICAL SURGERY

## 2018-10-28 PROCEDURE — 94799 UNLISTED PULMONARY SVC/PX: CPT

## 2018-10-28 PROCEDURE — 85025 COMPLETE CBC W/AUTO DIFF WBC: CPT | Performed by: INTERNAL MEDICINE

## 2018-10-28 PROCEDURE — 25010000002 CEFTRIAXONE PER 250 MG: Performed by: INTERNAL MEDICINE

## 2018-10-28 PROCEDURE — 80048 BASIC METABOLIC PNL TOTAL CA: CPT | Performed by: INTERNAL MEDICINE

## 2018-10-28 PROCEDURE — 25010000002 AZITHROMYCIN PER 500 MG: Performed by: INTERNAL MEDICINE

## 2018-10-28 RX ADMIN — FUROSEMIDE 40 MG: 40 TABLET ORAL at 17:00

## 2018-10-28 RX ADMIN — ATORVASTATIN CALCIUM 10 MG: 10 TABLET, FILM COATED ORAL at 08:47

## 2018-10-28 RX ADMIN — FUROSEMIDE 40 MG: 40 TABLET ORAL at 08:47

## 2018-10-28 RX ADMIN — PANTOPRAZOLE SODIUM 40 MG: 40 TABLET, DELAYED RELEASE ORAL at 05:58

## 2018-10-28 RX ADMIN — Medication 3 ML: at 08:47

## 2018-10-28 RX ADMIN — ONDANSETRON 4 MG: 4 TABLET, ORALLY DISINTEGRATING ORAL at 20:49

## 2018-10-28 RX ADMIN — CEFTRIAXONE SODIUM 1 G: 1 INJECTION, SOLUTION INTRAVENOUS at 13:12

## 2018-10-28 RX ADMIN — Medication 1000 MCG: at 08:47

## 2018-10-28 RX ADMIN — BUDESONIDE AND FORMOTEROL FUMARATE DIHYDRATE 2 PUFF: 160; 4.5 AEROSOL RESPIRATORY (INHALATION) at 07:19

## 2018-10-28 RX ADMIN — BUDESONIDE AND FORMOTEROL FUMARATE DIHYDRATE 2 PUFF: 160; 4.5 AEROSOL RESPIRATORY (INHALATION) at 20:33

## 2018-10-28 RX ADMIN — ACETAMINOPHEN 400 MCG: 400 TABLET ORAL at 08:47

## 2018-10-28 RX ADMIN — HYDROCODONE BITARTRATE AND ACETAMINOPHEN 1 TABLET: 10; 325 TABLET ORAL at 20:49

## 2018-10-28 RX ADMIN — AZITHROMYCIN MONOHYDRATE 500 MG: 500 INJECTION, POWDER, LYOPHILIZED, FOR SOLUTION INTRAVENOUS at 11:47

## 2018-10-28 RX ADMIN — VITAMIN D, TAB 1000IU (100/BT) 1000 UNITS: 25 TAB at 08:47

## 2018-10-28 RX ADMIN — Medication 3 ML: at 20:50

## 2018-10-28 RX ADMIN — SPIRONOLACTONE 25 MG: 25 TABLET ORAL at 08:47

## 2018-10-28 RX ADMIN — MONTELUKAST SODIUM 10 MG: 10 TABLET, FILM COATED ORAL at 20:49

## 2018-10-29 ENCOUNTER — TELEPHONE (OUTPATIENT)
Dept: NEUROSURGERY | Facility: CLINIC | Age: 83
End: 2018-10-29

## 2018-10-29 ENCOUNTER — APPOINTMENT (OUTPATIENT)
Dept: GENERAL RADIOLOGY | Facility: HOSPITAL | Age: 83
End: 2018-10-29

## 2018-10-29 DIAGNOSIS — S22.080A T12 COMPRESSION FRACTURE (HCC): Primary | ICD-10-CM

## 2018-10-29 PROCEDURE — 25010000002 AZITHROMYCIN PER 500 MG: Performed by: INTERNAL MEDICINE

## 2018-10-29 PROCEDURE — 94799 UNLISTED PULMONARY SVC/PX: CPT

## 2018-10-29 PROCEDURE — 92611 MOTION FLUOROSCOPY/SWALLOW: CPT

## 2018-10-29 PROCEDURE — 74230 X-RAY XM SWLNG FUNCJ C+: CPT

## 2018-10-29 PROCEDURE — 97110 THERAPEUTIC EXERCISES: CPT

## 2018-10-29 PROCEDURE — 25010000002 CEFTRIAXONE PER 250 MG: Performed by: INTERNAL MEDICINE

## 2018-10-29 PROCEDURE — 94762 N-INVAS EAR/PLS OXIMTRY CONT: CPT

## 2018-10-29 RX ADMIN — FUROSEMIDE 40 MG: 40 TABLET ORAL at 18:20

## 2018-10-29 RX ADMIN — ATORVASTATIN CALCIUM 10 MG: 10 TABLET, FILM COATED ORAL at 09:25

## 2018-10-29 RX ADMIN — BUDESONIDE AND FORMOTEROL FUMARATE DIHYDRATE 2 PUFF: 160; 4.5 AEROSOL RESPIRATORY (INHALATION) at 11:19

## 2018-10-29 RX ADMIN — HYDROCODONE BITARTRATE AND ACETAMINOPHEN 1 TABLET: 10; 325 TABLET ORAL at 22:27

## 2018-10-29 RX ADMIN — CEFTRIAXONE SODIUM 1 G: 1 INJECTION, SOLUTION INTRAVENOUS at 12:21

## 2018-10-29 RX ADMIN — HYDROCODONE BITARTRATE AND ACETAMINOPHEN 1 TABLET: 10; 325 TABLET ORAL at 13:08

## 2018-10-29 RX ADMIN — BARIUM SULFATE 65 ML: 960 POWDER, FOR SUSPENSION ORAL at 10:06

## 2018-10-29 RX ADMIN — Medication 1000 MCG: at 09:24

## 2018-10-29 RX ADMIN — FUROSEMIDE 40 MG: 40 TABLET ORAL at 09:24

## 2018-10-29 RX ADMIN — SPIRONOLACTONE 25 MG: 25 TABLET ORAL at 09:25

## 2018-10-29 RX ADMIN — Medication 3 ML: at 21:06

## 2018-10-29 RX ADMIN — AZITHROMYCIN MONOHYDRATE 500 MG: 500 INJECTION, POWDER, LYOPHILIZED, FOR SOLUTION INTRAVENOUS at 11:20

## 2018-10-29 RX ADMIN — ONDANSETRON 4 MG: 4 TABLET, FILM COATED ORAL at 22:27

## 2018-10-29 RX ADMIN — Medication 3 ML: at 09:26

## 2018-10-29 RX ADMIN — BARIUM SULFATE 4 ML: 980 POWDER, FOR SUSPENSION ORAL at 10:07

## 2018-10-29 RX ADMIN — ACETAMINOPHEN 400 MCG: 400 TABLET ORAL at 09:24

## 2018-10-29 RX ADMIN — MONTELUKAST SODIUM 10 MG: 10 TABLET, FILM COATED ORAL at 21:05

## 2018-10-29 RX ADMIN — PANTOPRAZOLE SODIUM 40 MG: 40 TABLET, DELAYED RELEASE ORAL at 06:49

## 2018-10-29 RX ADMIN — VITAMIN D, TAB 1000IU (100/BT) 1000 UNITS: 25 TAB at 09:24

## 2018-10-29 NOTE — TELEPHONE ENCOUNTER
Patients follow up is with [facility] (NO APRN available) on 11/12/18 at 10:15 AM with thoracic xrays prior. I call [facility] and spoke with Aisha and this will be added to the discharge. Letter/forms sent.

## 2018-10-29 NOTE — TELEPHONE ENCOUNTER
----- Message from JOSE Ramirez sent at 10/29/2018  9:20 AM EDT -----  Regarding: f/u  Pt seen for T12 VCF  by Dr. Yrn Ruth on October 27, 2018. Need follow up in 2 week(s)  with imaging thoracic xrays. Ok to see NP.

## 2018-10-30 PROCEDURE — 25010000002 CEFTRIAXONE PER 250 MG: Performed by: INTERNAL MEDICINE

## 2018-10-30 PROCEDURE — 97110 THERAPEUTIC EXERCISES: CPT

## 2018-10-30 PROCEDURE — 25010000002 ONDANSETRON PER 1 MG: Performed by: NURSE PRACTITIONER

## 2018-10-30 PROCEDURE — 94799 UNLISTED PULMONARY SVC/PX: CPT

## 2018-10-30 PROCEDURE — 94618 PULMONARY STRESS TESTING: CPT

## 2018-10-30 PROCEDURE — 25010000002 AZITHROMYCIN PER 500 MG: Performed by: INTERNAL MEDICINE

## 2018-10-30 RX ADMIN — ACETAMINOPHEN 400 MCG: 400 TABLET ORAL at 08:40

## 2018-10-30 RX ADMIN — MONTELUKAST SODIUM 10 MG: 10 TABLET, FILM COATED ORAL at 20:55

## 2018-10-30 RX ADMIN — ONDANSETRON 4 MG: 2 INJECTION INTRAMUSCULAR; INTRAVENOUS at 20:55

## 2018-10-30 RX ADMIN — ACETAMINOPHEN 650 MG: 325 TABLET, FILM COATED ORAL at 18:25

## 2018-10-30 RX ADMIN — AZITHROMYCIN MONOHYDRATE 500 MG: 500 INJECTION, POWDER, LYOPHILIZED, FOR SOLUTION INTRAVENOUS at 11:34

## 2018-10-30 RX ADMIN — PANTOPRAZOLE SODIUM 40 MG: 40 TABLET, DELAYED RELEASE ORAL at 08:40

## 2018-10-30 RX ADMIN — BUDESONIDE AND FORMOTEROL FUMARATE DIHYDRATE 2 PUFF: 160; 4.5 AEROSOL RESPIRATORY (INHALATION) at 20:04

## 2018-10-30 RX ADMIN — FUROSEMIDE 40 MG: 40 TABLET ORAL at 18:18

## 2018-10-30 RX ADMIN — HYDROCODONE BITARTRATE AND ACETAMINOPHEN 1 TABLET: 10; 325 TABLET ORAL at 20:55

## 2018-10-30 RX ADMIN — VITAMIN D, TAB 1000IU (100/BT) 1000 UNITS: 25 TAB at 08:40

## 2018-10-30 RX ADMIN — Medication 3 ML: at 20:55

## 2018-10-30 RX ADMIN — Medication 3 ML: at 08:46

## 2018-10-30 RX ADMIN — BUDESONIDE AND FORMOTEROL FUMARATE DIHYDRATE 2 PUFF: 160; 4.5 AEROSOL RESPIRATORY (INHALATION) at 10:11

## 2018-10-30 RX ADMIN — CEFTRIAXONE SODIUM 1 G: 1 INJECTION, SOLUTION INTRAVENOUS at 12:51

## 2018-10-30 RX ADMIN — ONDANSETRON 4 MG: 2 INJECTION INTRAMUSCULAR; INTRAVENOUS at 08:39

## 2018-10-30 RX ADMIN — Medication 1000 MCG: at 08:40

## 2018-10-30 RX ADMIN — SPIRONOLACTONE 25 MG: 25 TABLET ORAL at 08:41

## 2018-10-30 RX ADMIN — ATORVASTATIN CALCIUM 10 MG: 10 TABLET, FILM COATED ORAL at 08:40

## 2018-10-30 RX ADMIN — FUROSEMIDE 40 MG: 40 TABLET ORAL at 08:40

## 2018-10-31 VITALS
OXYGEN SATURATION: 93 % | SYSTOLIC BLOOD PRESSURE: 155 MMHG | HEIGHT: 61 IN | HEART RATE: 89 BPM | WEIGHT: 120.37 LBS | DIASTOLIC BLOOD PRESSURE: 105 MMHG | BODY MASS INDEX: 22.73 KG/M2 | TEMPERATURE: 98.5 F | RESPIRATION RATE: 18 BRPM

## 2018-10-31 PROBLEM — I50.32 CHRONIC DIASTOLIC (CONGESTIVE) HEART FAILURE (HCC): Status: ACTIVE | Noted: 2018-10-31

## 2018-10-31 PROCEDURE — 25010000002 CEFTRIAXONE PER 250 MG: Performed by: INTERNAL MEDICINE

## 2018-10-31 PROCEDURE — 94799 UNLISTED PULMONARY SVC/PX: CPT

## 2018-10-31 PROCEDURE — 97110 THERAPEUTIC EXERCISES: CPT

## 2018-10-31 RX ORDER — ACETAMINOPHEN 325 MG/1
650 TABLET ORAL EVERY 4 HOURS PRN
Start: 2018-10-31

## 2018-10-31 RX ORDER — AMOXICILLIN 875 MG/1
875 TABLET, COATED ORAL EVERY 12 HOURS SCHEDULED
Qty: 4 TABLET | Refills: 0 | Status: SHIPPED | OUTPATIENT
Start: 2018-10-31 | End: 2018-11-02

## 2018-10-31 RX ADMIN — AZITHROMYCIN MONOHYDRATE 500 MG: 500 INJECTION, POWDER, LYOPHILIZED, FOR SOLUTION INTRAVENOUS at 13:53

## 2018-10-31 RX ADMIN — FUROSEMIDE 40 MG: 40 TABLET ORAL at 09:23

## 2018-10-31 RX ADMIN — Medication 3 ML: at 10:34

## 2018-10-31 RX ADMIN — BUDESONIDE AND FORMOTEROL FUMARATE DIHYDRATE 2 PUFF: 160; 4.5 AEROSOL RESPIRATORY (INHALATION) at 09:33

## 2018-10-31 RX ADMIN — ACETAMINOPHEN 650 MG: 325 TABLET, FILM COATED ORAL at 13:06

## 2018-10-31 RX ADMIN — CEFTRIAXONE SODIUM 1 G: 1 INJECTION, SOLUTION INTRAVENOUS at 13:03

## 2018-10-31 RX ADMIN — VITAMIN D, TAB 1000IU (100/BT) 1000 UNITS: 25 TAB at 09:24

## 2018-10-31 RX ADMIN — Medication 1000 MCG: at 09:23

## 2018-10-31 RX ADMIN — ACETAMINOPHEN 400 MCG: 400 TABLET ORAL at 09:23

## 2018-10-31 RX ADMIN — ATORVASTATIN CALCIUM 10 MG: 10 TABLET, FILM COATED ORAL at 10:33

## 2018-10-31 RX ADMIN — SPIRONOLACTONE 25 MG: 25 TABLET ORAL at 09:23

## 2018-10-31 RX ADMIN — PANTOPRAZOLE SODIUM 40 MG: 40 TABLET, DELAYED RELEASE ORAL at 06:42

## 2018-11-01 ENCOUNTER — READMISSION MANAGEMENT (OUTPATIENT)
Dept: CALL CENTER | Facility: HOSPITAL | Age: 83
End: 2018-11-01

## 2018-11-01 NOTE — OUTREACH NOTE
Prep Survey      Responses   Facility patient discharged from?  [facility]   Is patient eligible?  Yes   Discharge diagnosis  Bacterial pneumonia, chronic diastolic CHF, CLosed comperession fracture of thoracic vertebra, CABP, strain of lumbar region, COPD, HLD, osteoporosis, intractable back pain   Does the patient have one of the following disease processes/diagnoses(primary or secondary)?  COPD/Pneumonia   Does the patient have Home health ordered?  Yes   What is the Home health agency?   [facility]   Is there a DME ordered?  Yes   What DME was ordered?  Hospital bed and oxygen setup per [facility]   Comments regarding appointments  Pt to schedule appointments.   Prep survey completed?  Yes          Sonia Corea RN

## 2018-11-05 ENCOUNTER — READMISSION MANAGEMENT (OUTPATIENT)
Dept: CALL CENTER | Facility: HOSPITAL | Age: 83
End: 2018-11-05

## 2018-11-05 NOTE — OUTREACH NOTE
COPD/PN Week 1 Survey      Responses   Facility patient discharged from?  [facility]   Does the patient have one of the following disease processes/diagnoses(primary or secondary)?  COPD/Pneumonia   Is there a successful TCM telephone encounter documented?  No   Was the primary reason for admission:  Pneumonia   Week 1 attempt successful?  No   Unsuccessful attempts  Attempt 1          Moira Greenwood, RN

## 2018-11-08 ENCOUNTER — READMISSION MANAGEMENT (OUTPATIENT)
Dept: CALL CENTER | Facility: HOSPITAL | Age: 83
End: 2018-11-08

## 2018-11-08 NOTE — OUTREACH NOTE
COPD/PN Week 1 Survey      Responses   Facility patient discharged from?  [facility]   Does the patient have one of the following disease processes/diagnoses(primary or secondary)?  COPD/Pneumonia   Is there a successful TCM telephone encounter documented?  No   Was the primary reason for admission:  Pneumonia   Week 1 attempt successful?  No   Unsuccessful attempts  Attempt 2          Fiona Clark RN

## 2018-11-12 ENCOUNTER — READMISSION MANAGEMENT (OUTPATIENT)
Dept: CALL CENTER | Facility: HOSPITAL | Age: 83
End: 2018-11-12

## 2018-11-12 ENCOUNTER — HOSPITAL ENCOUNTER (OUTPATIENT)
Dept: GENERAL RADIOLOGY | Facility: HOSPITAL | Age: 83
Discharge: HOME | End: 2018-11-12
Admitting: NURSE PRACTITIONER

## 2018-11-12 ENCOUNTER — OFFICE VISIT (OUTPATIENT)
Dept: NEUROSURGERY | Facility: CLINIC | Age: 83
End: 2018-11-12

## 2018-11-12 VITALS — SYSTOLIC BLOOD PRESSURE: 92 MMHG | HEIGHT: 61 IN | DIASTOLIC BLOOD PRESSURE: 60 MMHG | BODY MASS INDEX: 22.74 KG/M2

## 2018-11-12 DIAGNOSIS — S22.080A T12 COMPRESSION FRACTURE (HCC): ICD-10-CM

## 2018-11-12 DIAGNOSIS — S22.089D CLOSED FRACTURE OF TWELFTH THORACIC VERTEBRA WITH ROUTINE HEALING, UNSPECIFIED FRACTURE MORPHOLOGY, SUBSEQUENT ENCOUNTER: Primary | ICD-10-CM

## 2018-11-12 PROCEDURE — 99213 OFFICE O/P EST LOW 20 MIN: CPT | Performed by: NEUROLOGICAL SURGERY

## 2018-11-12 PROCEDURE — 72072 X-RAY EXAM THORAC SPINE 3VWS: CPT

## 2018-11-12 RX ORDER — NAPROXEN SODIUM 220 MG
220 TABLET ORAL 2 TIMES DAILY PRN
COMMUNITY
End: 2018-11-26

## 2018-11-12 RX ORDER — TRAMADOL HYDROCHLORIDE 50 MG/1
50 TABLET ORAL EVERY 6 HOURS PRN
Qty: 60 TABLET | Refills: 0 | Status: SHIPPED | OUTPATIENT
Start: 2018-11-12 | End: 2018-11-26 | Stop reason: SDUPTHER

## 2018-11-12 RX ORDER — PHENOL 1.4 %
AEROSOL, SPRAY (ML) MUCOUS MEMBRANE
COMMUNITY

## 2018-11-12 RX ORDER — DOCUSATE SODIUM 100 MG/1
100 CAPSULE, LIQUID FILLED ORAL 2 TIMES DAILY PRN
Qty: 100 CAPSULE | Refills: 2 | Status: SHIPPED | OUTPATIENT
Start: 2018-11-12

## 2018-11-12 NOTE — PROGRESS NOTES
"Subjective   Patient ID: Elisa Kunz is a 83 y.o. female who is here today for follow-up for a T12 compression fracture. She had thoracic xrays today at [facility]. She presents accompanied by her , son, and daughter.     History of Present Illness Patient presents for follow up.  Her back is hurting worse.  Despite my very specific instructions she has not been comoliant with the brace.  She completed antiobiotics.  She reports her leg strength is OK and denies numbness.  She denies loss of b/b.  SHe has been ambukating with a cane or walker as of late.  She has mild dementia and this is difficult in terms of interpreting her reported pain..  Her pain is is \"high\" and she reports.  She takes alleve.  She has poor tolerance to narcotics or anesthesia.      The following portions of the patient's history were reviewed and updated as appropriate: allergies, current medications, past family history, past medical history, past social history, past surgical history and problem list.    Review of Systems   Musculoskeletal: Positive for back pain. Negative for arthralgias.   Neurological: Negative for weakness and numbness.       Objective   Physical Exam  Neurologic Exam     5/5 LE str  sens intact  Brace fitting appopriately    Assessment/Plan   Independent Review of Radiographic Studies:  T12 compression fracture has worsened.    Medical Decision Making:  Several issues identified: brace compliance despite me clearly delineating the plan, narcotic use, bowel regimen.  We covered each in detail.   We need to make some progress in short order or I suggest we take the risk of anresthesia to address her fracture for comfort sake.  We will see her with new xrays in a week.      Elisa was seen today for back pain.    Diagnoses and all orders for this visit:    Closed fracture of twelfth thoracic vertebra with routine healing, unspecified fracture morphology, subsequent encounter  -     XR Spine Thoracic 2 " View; Future    Other orders  -     docusate sodium (COLACE) 100 MG capsule; Take 1 capsule by mouth 2 (Two) Times a Day As Needed for Constipation.  -     traMADol (ULTRAM) 50 MG tablet; Take 1 tablet by mouth Every 6 (Six) Hours As Needed for Moderate Pain .      No Follow-up on file.

## 2018-11-12 NOTE — OUTREACH NOTE
COPD/PN Week 2 Survey      Responses   Facility patient discharged from?  [facility]   Does the patient have one of the following disease processes/diagnoses(primary or secondary)?  COPD/Pneumonia   Was the primary reason for admission:  Pneumonia   Week 2 attempt successful?  Yes   Call start time  1742   Call end time  1748   Discharge diagnosis  Bacterial pneumonia, chronic diastolic CHF, CLosed comperession fracture of thoracic vertebra, CABP, strain of lumbar region, COPD, HLD, osteoporosis, intractable back pain   Is patient permission given to speak with other caregiver?  Yes   List who call center can speak with  Mr Kunz, spouse   Person spoke with today (if not patient) and relationship  Mr Kunz, spouse   Meds reviewed with patient/caregiver?  Yes   Is the patient having any side effects they believe may be caused by any medication additions or changes?  No   Does the patient have all medications ordered at discharge?  Yes   Is the patient taking all medications as directed (includes completed medication regime)?  Yes   Does the patient have a primary care provider?   Yes   Does the patient have an appointment with their PCP or pulmonologist within 7 days of discharge?  Greater than 7 days   Comments regarding PCP  John Adams MD. Patient has appt tomorrow.    What is preventing the patient from scheduling follow up appointments within 7 days of discharge?  Earlier appointment not available   Nursing Interventions  Verified appointment date/time/provider   Has the patient kept scheduled appointments due by today?  Yes   Comments  Patient saw Dr Ruth today.    What is the Home health agency?   [facility]   Has home health visited the patient within 72 hours of discharge?  Yes   What DME was ordered?  Hospital bed and oxygen setup per [facility]   Has all DME been delivered?  Yes   Psychosocial issues?  No   Did the patient receive a copy of their discharge instructions?  Yes   Nursing interventions   Reviewed instructions with patient   What is the patient's perception of their health status since discharge?  Improving [ states pneumonia improving,  states that back fractures not improving. ]   Is the patient/caregiver able to teach back the hierarchy of who to call/visit for symptoms/problems? PCP, Specialist, Home health nurse, Urgent Care, ED, 911  Yes   Additional teach back comments   states that patient will be wearing back brace all the time when up now, even if it is just for 5 minutes to go to the bathroom.    Is the patient/caregiver able to teach back signs and symptoms of worsening condition:  Fever/chills, Shortness of breath, Chest pain   Is the patient/caregiver able to teach back importance of completing antibiotic course of treatment?  Yes   Week 2 call completed?  Yes          Sonia Daniel RN

## 2018-11-19 ENCOUNTER — READMISSION MANAGEMENT (OUTPATIENT)
Dept: CALL CENTER | Facility: HOSPITAL | Age: 83
End: 2018-11-19

## 2018-11-19 NOTE — OUTREACH NOTE
COPD/PN Week 3 Survey      Responses   Facility patient discharged from?  [facility]   Does the patient have one of the following disease processes/diagnoses(primary or secondary)?  COPD/Pneumonia   Was the primary reason for admission:  Pneumonia   Week 3 attempt successful?  Yes   Call start time  1332   Call end time  1336   Person spoke with today (if not patient) and relationship  Mr Joaquina Ojeda reviewed with patient/caregiver?  Yes   Is the patient having any side effects they believe may be caused by any medication additions or changes?  No   Does the patient have all medications ordered at discharge?  Yes   Is the patient taking all medications as directed (includes completed medication regime)?  Yes   Comments regarding appointments  Kept appt with Dr Ruth last Thursday-has another appt on 11/26/18.   Does the patient have a primary care provider?   Yes   Does the patient have an appointment with their PCP or pulmonologist within 7 days of discharge?  Greater than 7 days   What is preventing the patient from scheduling follow up appointments within 7 days of discharge?  Earlier appointment not available   Has the patient kept scheduled appointments due by today?  Yes   Home health comments  Discharged from home health today.   Has all DME been delivered?  Yes   Psychosocial issues?  No   Did the patient receive a copy of their discharge instructions?  Yes   Nursing interventions  Reviewed instructions with patient   What is the patient's perception of their health status since discharge?  Improving   Nursing Interventions  Nurse provided patient education   Are the patient's immunizations up to date?   Yes   Is the patient/caregiver able to teach back the hierarchy of who to call/visit for symptoms/problems? PCP, Specialist, Home health nurse, Urgent Care, ED, 911  Yes   Is the patient/caregiver able to teach back signs and symptoms of worsening condition:  Fever/chills, Shortness of breath, Chest pain    Is the patient/caregiver able to teach back importance of completing antibiotic course of treatment?  Yes   Week 3 call completed?  Yes   Wrap up additional comments   states is doing well-continues to use home oxygen at night. Denies any problems today.          Moira Greenwood RN

## 2018-11-26 ENCOUNTER — OFFICE VISIT (OUTPATIENT)
Dept: NEUROSURGERY | Facility: CLINIC | Age: 83
End: 2018-11-26

## 2018-11-26 ENCOUNTER — HOSPITAL ENCOUNTER (OUTPATIENT)
Dept: GENERAL RADIOLOGY | Facility: HOSPITAL | Age: 83
Discharge: HOME | End: 2018-11-26
Attending: NEUROLOGICAL SURGERY | Admitting: NEUROLOGICAL SURGERY

## 2018-11-26 VITALS
HEIGHT: 61 IN | DIASTOLIC BLOOD PRESSURE: 78 MMHG | RESPIRATION RATE: 18 BRPM | BODY MASS INDEX: 22.74 KG/M2 | SYSTOLIC BLOOD PRESSURE: 110 MMHG | HEART RATE: 105 BPM

## 2018-11-26 DIAGNOSIS — S22.089D CLOSED FRACTURE OF TWELFTH THORACIC VERTEBRA WITH ROUTINE HEALING, UNSPECIFIED FRACTURE MORPHOLOGY, SUBSEQUENT ENCOUNTER: ICD-10-CM

## 2018-11-26 DIAGNOSIS — S22.080D CLOSED WEDGE COMPRESSION FRACTURE OF TWELFTH THORACIC VERTEBRA WITH ROUTINE HEALING, SUBSEQUENT ENCOUNTER: Primary | ICD-10-CM

## 2018-11-26 PROCEDURE — 99213 OFFICE O/P EST LOW 20 MIN: CPT | Performed by: NEUROLOGICAL SURGERY

## 2018-11-26 PROCEDURE — 72070 X-RAY EXAM THORAC SPINE 2VWS: CPT

## 2018-11-26 RX ORDER — TRAMADOL HYDROCHLORIDE 50 MG/1
50 TABLET ORAL EVERY 6 HOURS PRN
Qty: 60 TABLET | Refills: 0 | Status: SHIPPED | OUTPATIENT
Start: 2018-11-26 | End: 2019-02-01 | Stop reason: SDUPTHER

## 2018-11-26 NOTE — PROGRESS NOTES
Subjective   Patient ID: Elisa Kunz is a 83 y.o. female is here today for follow-up back pain s/p T12 VCF. Patient had repeat thoracic xrays today and presents accompanied by her  and son.     History of Present Illness 82 yo lady with t12 fracture.  This was progressive.  Her pain is much better today.  She is taking pain medication only 1x per day.  She denies constipation.      The following portions of the patient's history were reviewed and updated as appropriate: allergies, current medications, past family history, past medical history, past social history, past surgical history and problem list.    Review of Systems   Genitourinary: Negative for difficulty urinating and enuresis.   Musculoskeletal: Positive for back pain.        Bilateral thigh pain   Neurological: Negative for weakness and numbness.   Psychiatric/Behavioral: Negative for sleep disturbance.       Objective   Physical Exam  Neurologic Exam     5/5 LE str  sens intact  Brace fitting appopriately    Assessment/Plan   Independent Review of Radiographic Studies:  Stable t12 fracture.      Medical Decision Making:  DOI 25 oct.  She is making progress.  We will continue to monitor her.  She has poor tolerance of anesthesia.  I see no reason to proceed with kyphoplasty given her gains.  She required no refills today.  We will check her in a month.      Elisa was seen today for back pain.    Diagnoses and all orders for this visit:    Closed wedge compression fracture of twelfth thoracic vertebra with routine healing, subsequent encounter  -     XR Spine Thoracic 2 View; Future    Other orders  -     traMADol (ULTRAM) 50 MG tablet; Take 1 tablet by mouth Every 6 (Six) Hours As Needed for Moderate Pain .      No Follow-up on file.

## 2019-01-02 ENCOUNTER — OFFICE VISIT (OUTPATIENT)
Dept: NEUROSURGERY | Facility: CLINIC | Age: 84
End: 2019-01-02

## 2019-01-02 ENCOUNTER — HOSPITAL ENCOUNTER (OUTPATIENT)
Dept: GENERAL RADIOLOGY | Facility: HOSPITAL | Age: 84
Discharge: HOME | End: 2019-01-02
Attending: NEUROLOGICAL SURGERY | Admitting: NEUROLOGICAL SURGERY

## 2019-01-02 VITALS — SYSTOLIC BLOOD PRESSURE: 120 MMHG | HEART RATE: 80 BPM | DIASTOLIC BLOOD PRESSURE: 80 MMHG | RESPIRATION RATE: 16 BRPM

## 2019-01-02 DIAGNOSIS — S22.089D CLOSED FRACTURE OF TWELFTH THORACIC VERTEBRA WITH ROUTINE HEALING, UNSPECIFIED FRACTURE MORPHOLOGY, SUBSEQUENT ENCOUNTER: Primary | ICD-10-CM

## 2019-01-02 DIAGNOSIS — S22.080D CLOSED WEDGE COMPRESSION FRACTURE OF TWELFTH THORACIC VERTEBRA WITH ROUTINE HEALING, SUBSEQUENT ENCOUNTER: ICD-10-CM

## 2019-01-02 PROCEDURE — 72070 X-RAY EXAM THORAC SPINE 2VWS: CPT

## 2019-01-02 PROCEDURE — 99213 OFFICE O/P EST LOW 20 MIN: CPT | Performed by: NEUROLOGICAL SURGERY

## 2019-01-02 RX ORDER — DONEPEZIL HYDROCHLORIDE 5 MG/1
5 TABLET, FILM COATED ORAL DAILY
COMMUNITY
Start: 2018-11-13 | End: 2019-01-30 | Stop reason: SDUPTHER

## 2019-01-02 NOTE — PROGRESS NOTES
Subjective   Patient ID: Elisa Kunz is a 83 y.o. female is here today for follow-up  For T12 fracture with new imaging..    History of Present Illness  Patient reports some increased activity around the holidays and some pain.  SH eis about 9 weeks s/p fall.  Overall she has been doing well.      The following portions of the patient's history were reviewed and updated as appropriate: allergies, current medications, past family history, past medical history, past social history, past surgical history and problem list.    Review of Systems   Musculoskeletal: Positive for back pain and gait problem.   Neurological: Positive for weakness.   Psychiatric/Behavioral: Positive for confusion and decreased concentration.       Objective   Physical Exam  Neurologic Exam     5/5 LE str  sens intact  Brace fitting appopriately  Back is nttp     Assessment/Plan   Independent Review of Radiographic Studies:  Stable t12 fracture.    Medical Decision Making:  We will refill her pain meds.  Realistically she may need to be on some low dose narcotics intermittently.  Her pain is more low lumbar today.  We will see her in a month and begin to wean her brace.      There are no diagnoses linked to this encounter.  No Follow-up on file.

## 2019-01-30 ENCOUNTER — OFFICE VISIT (OUTPATIENT)
Dept: NEUROSURGERY | Facility: CLINIC | Age: 84
End: 2019-01-30

## 2019-01-30 ENCOUNTER — HOSPITAL ENCOUNTER (OUTPATIENT)
Dept: GENERAL RADIOLOGY | Facility: HOSPITAL | Age: 84
Discharge: HOME | End: 2019-01-30
Attending: NEUROLOGICAL SURGERY | Admitting: NEUROLOGICAL SURGERY

## 2019-01-30 VITALS
BODY MASS INDEX: 22.74 KG/M2 | SYSTOLIC BLOOD PRESSURE: 100 MMHG | DIASTOLIC BLOOD PRESSURE: 70 MMHG | HEART RATE: 80 BPM | HEIGHT: 61 IN | RESPIRATION RATE: 12 BRPM

## 2019-01-30 DIAGNOSIS — S22.000A CLOSED COMPRESSION FRACTURE OF THORACIC VERTEBRA, INITIAL ENCOUNTER (HCC): Primary | ICD-10-CM

## 2019-01-30 DIAGNOSIS — S22.089D CLOSED FRACTURE OF TWELFTH THORACIC VERTEBRA WITH ROUTINE HEALING, UNSPECIFIED FRACTURE MORPHOLOGY, SUBSEQUENT ENCOUNTER: ICD-10-CM

## 2019-01-30 PROCEDURE — 72070 X-RAY EXAM THORAC SPINE 2VWS: CPT

## 2019-01-30 PROCEDURE — 99213 OFFICE O/P EST LOW 20 MIN: CPT | Performed by: NEUROLOGICAL SURGERY

## 2019-01-30 RX ORDER — HYDROCODONE BITARTRATE AND ACETAMINOPHEN 5; 325 MG/1; MG/1
1 TABLET ORAL EVERY 6 HOURS PRN
Qty: 20 TABLET | Refills: 0 | Status: SHIPPED | OUTPATIENT
Start: 2019-01-30

## 2019-01-30 RX ORDER — PHENOL 1.4 %
600 AEROSOL, SPRAY (ML) MUCOUS MEMBRANE
COMMUNITY
Start: 2019-01-03

## 2019-01-30 RX ORDER — HYDROCODONE BITARTRATE AND ACETAMINOPHEN 5; 325 MG/1; MG/1
1 TABLET ORAL EVERY 6 HOURS PRN
COMMUNITY
End: 2019-01-30 | Stop reason: SDUPTHER

## 2019-01-30 RX ORDER — DONEPEZIL HYDROCHLORIDE 10 MG/1
TABLET, FILM COATED ORAL NIGHTLY
Refills: 1 | COMMUNITY
Start: 2019-01-03

## 2019-01-30 NOTE — PROGRESS NOTES
Subjective   Patient ID: Elisa Kunz is a 83 y.o. female is here today for follow-up for thoracic pain.  Patient is accompanied by the  and son.    History of Present Illness 84 yo lady with history of a fall.  She had a t12 fracture.  This was about 13 weeks ago.  She denoes weakness.  SHe has some low back pain.      The following portions of the patient's history were reviewed and updated as appropriate: allergies, current medications, past family history, past medical history, past social history, past surgical history and problem list.    Review of Systems   Genitourinary: Negative for difficulty urinating and enuresis.   Musculoskeletal: Positive for back pain. Negative for arthralgias.        Left knee pain   Neurological: Negative for weakness and numbness.   Psychiatric/Behavioral: Negative for sleep disturbance.       Objective   Physical Exam  Neurologic Exam     5/5 LE str  sens intact  Brace fitting appopriately  Back is nttp       Assessment/Plan   Independent Review of Radiographic Studies:  Stable t12 fracture    Medical Decision Making:  We will wean her from the brace.  Realistically she may need to be on some low dose narcotics intermittently.   We discussed weaning the brace.  I gave her a final Rx for a few hydrocodone for bad days.      Elisa was seen today for back pain.    Diagnoses and all orders for this visit:    Closed compression fracture of thoracic vertebra, initial encounter (CMS/[facility])    Other orders  -     HYDROcodone-acetaminophen (NORCO) 5-325 MG per tablet; Take 1 tablet by mouth Every 6 (Six) Hours As Needed for Severe Pain .      No Follow-up on file.

## 2019-02-01 ENCOUNTER — TELEPHONE (OUTPATIENT)
Dept: NEUROSURGERY | Facility: CLINIC | Age: 84
End: 2019-02-01

## 2019-02-01 RX ORDER — TRAMADOL HYDROCHLORIDE 50 MG/1
50 TABLET ORAL EVERY 6 HOURS PRN
Qty: 28 TABLET | Refills: 0 | OUTPATIENT
Start: 2019-02-01

## 2019-02-01 NOTE — TELEPHONE ENCOUNTER
Patient's pharmacy sent PA request for tramadol.  Rx was written by C in November, rx dropped off by patient today.  PA needed due to quantity being greater than a 7 day supply.  LB wrote new script for a 7 day supply for tramadol, called in to patient's pharmacy.

## (undated) DEVICE — UNDERCAST PADDING: Brand: DEROYAL

## (undated) DEVICE — KT DRN EVAC WND PVC PCH WTROC RND 10F400

## (undated) DEVICE — MAT FLR ABSORBENT LG 4FT 10 2.5FT

## (undated) DEVICE — DUAL CUT SAGITTAL BLADE

## (undated) DEVICE — ENCORE® LATEX ORTHO SIZE 7.5, STERILE LATEX POWDER-FREE SURGICAL GLOVE: Brand: ENCORE

## (undated) DEVICE — ANTIBACTERIAL UNDYED BRAIDED (POLYGLACTIN 910), SYNTHETIC ABSORBABLE SUTURE: Brand: COATED VICRYL

## (undated) DEVICE — BNDG ELAS ELITE V/CLOSE 6IN 5YD LF STRL

## (undated) DEVICE — STPLR SKIN VISISTAT WD 35CT

## (undated) DEVICE — NDL SPINE 20G 3 1/2 YEL STRL 1P/U

## (undated) DEVICE — DRAPE,REIN 53X77,STERILE: Brand: MEDLINE

## (undated) DEVICE — OCCLUSIVE GAUZE STRIP,3% BISMUTH TRIBROMOPHENATE IN PETROLATUM BLEND: Brand: XEROFORM

## (undated) DEVICE — PK KN TOTL 40

## (undated) DEVICE — SPNG GZ WOVN 4X4IN 12PLY 10/BX STRL

## (undated) DEVICE — GLV SURG BIOGEL LTX PF 7 1/2

## (undated) DEVICE — BNDG ELAS ELITE V/CLOSE 4IN 5YD LF STRL

## (undated) DEVICE — PAD,ABDOMINAL,8"X10",ST,LF: Brand: MEDLINE

## (undated) DEVICE — DECANT BG O JET

## (undated) DEVICE — PIN TROC SIGNATURE PK/2

## (undated) DEVICE — APPL CHLORAPREP W/TINT 26ML ORNG